# Patient Record
Sex: FEMALE | Race: WHITE | NOT HISPANIC OR LATINO | Employment: UNEMPLOYED | URBAN - METROPOLITAN AREA
[De-identification: names, ages, dates, MRNs, and addresses within clinical notes are randomized per-mention and may not be internally consistent; named-entity substitution may affect disease eponyms.]

---

## 2017-02-08 ENCOUNTER — ALLSCRIPTS OFFICE VISIT (OUTPATIENT)
Dept: OTHER | Facility: OTHER | Age: 52
End: 2017-02-08

## 2017-02-28 ENCOUNTER — ALLSCRIPTS OFFICE VISIT (OUTPATIENT)
Dept: OTHER | Facility: OTHER | Age: 52
End: 2017-02-28

## 2017-03-09 ENCOUNTER — ALLSCRIPTS OFFICE VISIT (OUTPATIENT)
Dept: OTHER | Facility: OTHER | Age: 52
End: 2017-03-09

## 2017-03-09 LAB — S PYO AG THROAT QL: POSITIVE

## 2017-04-04 ENCOUNTER — GENERIC CONVERSION - ENCOUNTER (OUTPATIENT)
Dept: OTHER | Facility: OTHER | Age: 52
End: 2017-04-04

## 2017-05-31 ENCOUNTER — HOSPITAL ENCOUNTER (OUTPATIENT)
Dept: RADIOLOGY | Age: 52
Discharge: HOME | End: 2017-05-31
Payer: COMMERCIAL

## 2017-05-31 DIAGNOSIS — Z12.31 ENCOUNTER FOR SCREENING MAMMOGRAM FOR MALIGNANT NEOPLASM OF BREAST: ICD-10-CM

## 2017-05-31 PROCEDURE — G0202 SCR MAMMO BI INCL CAD: HCPCS

## 2017-06-28 ENCOUNTER — ALLSCRIPTS OFFICE VISIT (OUTPATIENT)
Dept: OTHER | Facility: OTHER | Age: 52
End: 2017-06-28

## 2017-06-28 DIAGNOSIS — F32.9 MAJOR DEPRESSIVE DISORDER, SINGLE EPISODE: ICD-10-CM

## 2017-06-28 DIAGNOSIS — R53.83 OTHER FATIGUE: ICD-10-CM

## 2017-06-28 DIAGNOSIS — M79.10 MYALGIA: ICD-10-CM

## 2017-06-28 DIAGNOSIS — F41.9 ANXIETY DISORDER: ICD-10-CM

## 2017-06-28 DIAGNOSIS — Z13.6 ENCOUNTER FOR SCREENING FOR CARDIOVASCULAR DISORDERS: ICD-10-CM

## 2017-06-28 DIAGNOSIS — R79.89 OTHER SPECIFIED ABNORMAL FINDINGS OF BLOOD CHEMISTRY: ICD-10-CM

## 2017-06-28 DIAGNOSIS — D50.9 IRON DEFICIENCY ANEMIA: ICD-10-CM

## 2017-06-28 DIAGNOSIS — R51 HEADACHE(784.0): ICD-10-CM

## 2017-06-30 ENCOUNTER — ALLSCRIPTS OFFICE VISIT (OUTPATIENT)
Dept: OTHER | Facility: OTHER | Age: 52
End: 2017-06-30

## 2017-08-08 ENCOUNTER — ALLSCRIPTS OFFICE VISIT (OUTPATIENT)
Dept: OTHER | Facility: OTHER | Age: 52
End: 2017-08-08

## 2017-08-08 ENCOUNTER — TRANSCRIBE ORDERS (OUTPATIENT)
Dept: ADMINISTRATIVE | Facility: HOSPITAL | Age: 52
End: 2017-08-08

## 2017-08-08 DIAGNOSIS — R41.3 MEMORY LOSS: Primary | ICD-10-CM

## 2017-08-08 DIAGNOSIS — G43.709 CHRONIC MIGRAINE WITHOUT AURA WITHOUT STATUS MIGRAINOSUS, NOT INTRACTABLE: ICD-10-CM

## 2017-08-08 DIAGNOSIS — R41.3 OTHER AMNESIA: ICD-10-CM

## 2017-08-22 ENCOUNTER — HOSPITAL ENCOUNTER (OUTPATIENT)
Dept: RADIOLOGY | Facility: HOSPITAL | Age: 52
Discharge: HOME | End: 2017-08-22
Attending: PSYCHIATRY & NEUROLOGY
Payer: COMMERCIAL

## 2017-08-22 DIAGNOSIS — R41.3 OTHER AMNESIA: ICD-10-CM

## 2017-08-22 PROCEDURE — A9585 GADOBUTROL INJECTION: HCPCS | Performed by: PSYCHIATRY & NEUROLOGY

## 2017-08-22 PROCEDURE — 70553 MRI BRAIN STEM W/O & W/DYE: CPT

## 2017-08-22 RX ADMIN — GADOBUTROL 7 ML: 604.72 INJECTION INTRAVENOUS at 10:31

## 2017-11-09 ENCOUNTER — ALLSCRIPTS OFFICE VISIT (OUTPATIENT)
Dept: OTHER | Facility: OTHER | Age: 52
End: 2017-11-09

## 2017-11-10 NOTE — PROGRESS NOTES
Assessment    1  Chronic migraine without aura (346 70) (G43 709)   2  Memory difficulties (780 93) (R41 3)   3  Tension type headache (339 10) (G44 209)   4  Anxiety and depression (300 00,311) (F41 8)    Plan  Chronic migraine without aura    · Divalproex Sodium  MG Oral Tablet Extended Release 24 Hour; 1 tablet atbedtime x 5 days then increase to 2 tablets at bedtime there after   Rx By: Jovanny Jauregui; Dispense: 0 Days ; #:60 Tablet Extended Release 24 Hour; Refill: 6;Chronic migraine without aura; [place] = N; Verified Transmission to [facility]/PHARMACY #[id number] Last Updated By: [employer]; 11/9/2017 11:31:16 AM   · Prochlorperazine Maleate 10 MG Oral Tablet; 1 PO Q 6H PRN NAUSEA/HEADACHE(MAX 3 DAYS/WK)   Rx By: Jovanny Jauregui; Dispense: 0 Days ; #:15 Tablet; Refill: 3;Chronic migraine without aura; [place] = N; Sent To: [facility]/PHARMACY #[id number]   · Follow-up visit in 3 months Evaluation and Treatment  Follow-up with Viviane Phillips  Status: Complete  Done: [id number]   Ordered;Chronic migraine without aura; Ordered By: Jovanny Jauregui Performed:  Due: 14KYX8636; Last Updated By: Maria E Gaviria; 11/9/2017 11:38:08 AM    Discussion/Summary  Discussion Summary:   Preventive;Depakote 250 mg one at bedtime for 5 days then two after that  Magnesium oxide 400mg in am dailycyproheptadine 4mg as needed for weather related headaches  Compazine 10mg if needed  loss:MRI - will repeat in a year to follow up on the diffuse atrophy noted  Consider NP eval once her headaches have improved  Chief Complaint  Chief Complaint Free Text Note Form: Pt is here for a neuro f/u; memory, headaches  History of Present Illness  HPI: We had the pleasure of evaluating Mark Banuelos in neurological follow up today  As you know she is a 47year old right handed female  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  Her 5year old has autism  She has history of uterine cancer and is s/p full hysterectomy in 2011   She had no chemo or radiation  problem:She states she has stress in her life due to her daughter having autism  She has noted she has lost her car in the parking lot  She walks into a place and is not sure why she came in for  She has gotten to places and is not sure how she got there  For the last 2-3 years she is having more memory loss  Continues to be a problem as the stress increases in her lift   migraine headaches:Mag 280mg drink, CyproheptadineExcedrin, Tylenol,trigger: Fatigue, Stress/Tension, Weather change,none  states she had more stress this morning which is causing a headaches for her at this time  often do the headaches occur â for the last 17 years  She has noted no significant improvement in her headaches  but has noted Cyproheptadine does prevent a headache from coming on when she takes at night due to weather change  time of the day do the headaches start â usually in the morninglong do the headaches last â depending on the day and weather  By 10 usually it tends to get betterare they located â occipital and at time temporal region  Can be orbitalis the intensity of pain â 1 to 8/10 â it can get up to 8/10 at least twice monthyour usual headache - Throbbing, Pounding, pressuresymptoms:Decrease of appetite, some nausea, diarrheaPhotophobia, phonophobia, sensitivity to smellProblem with concentrationrunny or stuffed-up noselight-headed or dizzy, stiff or sore neck,prefer to be alone and in a dark room, unable to work  ROS personally  head 8/23/2017    BRAIN PARENCHYMA: Volume loss, much greater than would be expected for age, prominence of all CSF-containing spaces  No acute ischemic comment cranial mass, mass effect or edema  No hemosiderin deposition  Xanthogranuloma choroid plexus stable  QUANTITATIVE:    Exam Quality: Adequate for volumetric analysis      Hippocampus    Total hippocampal volume (cc): 8 4  Percentile for similar age: 28th    Lateral ventricular volume (cc): 60 92  Percentile for similar age: Greater than 95th    Inferior lateral vent volume (cc): 1 62  Percentile for similar age: 29th      Quantitative conclusions:  Hippocampi:  Slightly diminished  Lateral Ventricle Volume: Markedly prominent  Temporal Horn Volume: Relatively diminished    Concordance between qualitative and quantitative hippocampal volume assessment: Concordant  Change in brain volumes: No previous volumetric study for comparison    Mean hippocampal volume loss among normal elderly: 0 7% per year, (-0 3 to 1 7; Bishop 2008; also Wilfredo 2010)  VENTRICLES: Prominent for age, reflecting diffuse generalized volume loss    SELLA AND PITUITARY GLAND: Normal     ORBITS: Normal     PARANASAL SINUSES: Normal     VASCULATURE: Evaluation of the major intracranial vasculature demonstrates appropriate flow voids  CALVARIUM AND SKULL BASE: Normal     EXTRACRANIAL SOFT TISSUES: Normal           IMPRESSION:    1  No acute disease  Diffuse generalized volume loss  Review of Systems  Neurological ROS:  Constitutional: recent weight gain,-- fatigue-- and-- appetite changes  HEENT: sinus problems-- and-- feeling congested  Cardiovascular:  no chest pain or pressure, no palpitations present, the heart rate was not rapid or irregular, no swelling in the arms or legs, no poor circulation  Respiratory:  no unusual or persistant cough, no shortness of breath with or without exertion  Gastrointestinal: diarrhea  Genitourinary:  no incontinence, no feelings of urinary urgency, no increase in frequency, no urinary hesitancy, no dysuria, no hematuria  Musculoskeletal: arthralgias-- and-- head/neck/back pain  Integumentary  no masses, no rash, no skin lesions, no livedo reticularis  Psychiatric: mood swings  Endocrine loss of sexual ability or drive   Hematologic/Lymphatic:  no unusual bleeding, no tendency for easy bruising, no clotting skin or lumps    Neurological General: headache,-- increased sleepiness-- and-- waking up at night  Neurological Mental Status: memory problems  Neurological Cranial Nerves:  no blurry or double vision, no loss of vision, no face drooping, no facial numbness or weakness, no taste or smell loss/changes, no hearing loss or ringing, no vertigo or dizziness, no dysphagia, no slurred speech  Neurological Motor findings include:  no tremor, no twitching, no cramping(pre/post exercise), no atrophy  Neurological Coordination:  no unsteadiness, no vertigo or dizziness, no clumsiness, no problems reaching for objects  Neurological Sensory:  no numbness, no pain, no tingling, does not fall when eyes closed or taking a shower  Neurological Gait:  no difficulty walking, not falling to one side, no sensation of being pushed, has not had falls  ROS Reviewed:   ROS reviewed  Active Problems  1  Abnormal CBC (790 6) (R79 89)   2  Allergic rhinitis (477 9) (J30 9)   3  Anxiety and depression (300 00,311) (F41 8)   4  Chronic headaches (784 0) (R51)   5  Chronic migraine without aura (346 70) (G43 709)   6  Chronic pelvic pain in female (186 3,009 24) (R10 2,G89 29)   7  Colon cancer screening (V76 51) (Z12 11)   8  Early menopause (256 31) (E28 319)   9  Encounter for screening for vascular disease (V81 2) (Z13 6)   10  Fatigue (780 79) (R53 83)   11  Groin mass (789 39) (R19 09)   12  IBS (irritable bowel syndrome) (564 1) (K58 9)   13  Insomnia (780 52) (G47 00)   14  Iron deficiency anemia (280 9) (D50 9)   15  Memory difficulties (780 93) (R41 3)   16  Myalgia (729 1) (M79 1)   17  Oral herpes (054 2) (B00 2)   18  Sore throat (462) (J02 9)   19  Tension type headache (339 10) (G44 209)   20  Viral URI (465 9) (J06 9,B97 89)    Past Medical History  1  History of endometrial cancer (V10 42) (Z85 42)   2  History of sinusitis (V12 69) (Z87 09)   3  History of streptococcal pharyngitis (V12 09) (Z87 09)    Surgical History  1   History of Total Abdominal Hysterectomy With Removal Of Both Ovaries    Family History  Mother    1  Family history of hypertension (V17 49) (Z82 49)   2  Family history of malignant neoplasm of breast (V16 3) (Z80 3)  Father    3  Family history of malignant neoplasm of prostate (V16 42) (Z80 42)   4  Family history of Pacemaker Placement  Sister    5  Family history of Anxiety and depression    Social History     · Housewife or homemaker   ·    · Never a smoker   · No secondhand smoke exposure (V49 89) (Z78 9)   · Reading    Current Meds   1  ALPRAZolam 0 5 MG Oral Tablet; TAKE 1 TABLET AT BEDTIME AS NEEDED FOR SLEEP; Therapy: 05VWZ8823 to (Evaluate:10Mar2017); Last Rx:08Feb2017 Ordered   2  ClonazePAM 0 5 MG Oral Tablet; TAKE 1 TABLET AT BEDTIME AS NEEDED; Therapy: 37CYG0773 to Recorded   3  CloNIDine HCl - 0 1 MG Oral Tablet; Therapy: (75 196 772) to Recorded   4  Cyproheptadine HCl - 4 MG Oral Tablet; TAKE 2 TAB QHS; Therapy: 31Dlf6066 to (Evaluate:09Cjg6608)  Requested for: 29Aug2017; Last Rx:29Aug2017 Ordered   5  DULoxetine HCl - 30 MG Oral Capsule Delayed Release Particles; 90 mg daily; Therapy: 26JQV2853 to (Last Rx:08Feb2017) Ordered   6  HydrOXYzine HCl - 50 MG Oral Tablet; TAKE 1 TABLET AT BEDTIME; Therapy: 56NDH9697 to (Evaluate:00Olt6294); Last Rx:08Feb2017 Ordered   7  Hyoscyamine Sulfate 0 125 MG Oral Tablet Disintegrating; TAKE 1 TABLET Twice daily PRN; Therapy: 58LSY4657 to (Last Rx:08Feb2017) Ordered   8  Rexulti 0 5 MG Oral Tablet; Therapy: (75 196 772) to Recorded   9  ValACYclovir HCl - 500 MG Oral Tablet; TAKE ONE TABLET BY MOUTH ONCE DAILY; Therapy: 22HIO6099 to (Last Rx:08Feb2017) Ordered    Allergies    1  No Known Drug Allergies    Vitals  Signs   Recorded: 73KEQ1917 11:06AM   Heart Rate: 98  Systolic: 724  Diastolic: 68  Weight: 360 lb   BMI Calculated: 24 48  BSA Calculated: 1 86    Physical Exam   Constitutional  General appearance: No acute distress, well appearing and well nourished     Eyes  Ophthalmoscopic examination: Vision is grossly normal  Gross visual field testing by confrontation shows no abnormalities  EOMI in both eyes  Conjunctivae clear  Eyelids normal palpebral fissures equal  Orbits exhibit normal position  No discharge from the eyes  PERRL  Musculoskeletal  Gait and station: Normal gait, stance and balance  Muscle strength: Normal strength throughout  Muscle tone: No atrophy, abnormal movements, flaccidity, cogwheeling or spasticity     Neurologic  Orientation to person, place, and time: Normal    2nd cranial nerve: Normal    3rd, 4th, and 6th cranial nerves: Normal    5th cranial nerve: Normal    7th cranial nerve: Normal    8th cranial nerve: Normal    9th cranial nerve: Normal    11th cranial nerve: Normal    12th cranial nerve: Normal    Sensation: Normal    Reflexes: Normal    Coordination: Normal    Mood and affect: Normal        Future Appointments    Date/Time Provider Specialty Site   02[facility]14/2018 10:45 [facility]       Signatures   Electronically signed by : Isaias Dickson MD; Nov 9 2017 11:42AM EST                       (Author)

## 2018-01-12 VITALS
SYSTOLIC BLOOD PRESSURE: 130 MMHG | BODY MASS INDEX: 23.19 KG/M2 | TEMPERATURE: 96.8 F | HEART RATE: 60 BPM | DIASTOLIC BLOOD PRESSURE: 70 MMHG | HEIGHT: 68 IN | WEIGHT: 153 LBS

## 2018-01-13 VITALS
SYSTOLIC BLOOD PRESSURE: 122 MMHG | HEART RATE: 96 BPM | BODY MASS INDEX: 23.79 KG/M2 | HEIGHT: 68 IN | DIASTOLIC BLOOD PRESSURE: 70 MMHG | WEIGHT: 157 LBS | TEMPERATURE: 98 F

## 2018-01-13 VITALS
HEIGHT: 68 IN | BODY MASS INDEX: 23.27 KG/M2 | SYSTOLIC BLOOD PRESSURE: 130 MMHG | HEART RATE: 84 BPM | WEIGHT: 153.5 LBS | DIASTOLIC BLOOD PRESSURE: 80 MMHG | TEMPERATURE: 98.3 F

## 2018-01-14 VITALS
HEIGHT: 68 IN | SYSTOLIC BLOOD PRESSURE: 128 MMHG | BODY MASS INDEX: 23.53 KG/M2 | DIASTOLIC BLOOD PRESSURE: 80 MMHG | HEART RATE: 68 BPM | WEIGHT: 155.25 LBS

## 2018-01-14 VITALS
SYSTOLIC BLOOD PRESSURE: 122 MMHG | BODY MASS INDEX: 23.79 KG/M2 | HEART RATE: 64 BPM | HEIGHT: 68 IN | WEIGHT: 157 LBS | DIASTOLIC BLOOD PRESSURE: 70 MMHG

## 2018-01-15 VITALS
HEIGHT: 68 IN | SYSTOLIC BLOOD PRESSURE: 152 MMHG | RESPIRATION RATE: 16 BRPM | DIASTOLIC BLOOD PRESSURE: 71 MMHG | BODY MASS INDEX: 23.73 KG/M2 | HEART RATE: 106 BPM | WEIGHT: 156.56 LBS

## 2018-01-15 VITALS
BODY MASS INDEX: 24.48 KG/M2 | DIASTOLIC BLOOD PRESSURE: 68 MMHG | SYSTOLIC BLOOD PRESSURE: 138 MMHG | WEIGHT: 161 LBS | HEART RATE: 98 BPM

## 2018-01-17 NOTE — PROGRESS NOTES
Assessment    1  Strep pharyngitis (034 0) (J02 0)   2  Sinusitis (473 9) (J32 9)   3  Allergic rhinitis (477 9) (J30 9)    Plan  Allergic rhinitis    · Fluticasone Propionate 50 MCG/ACT Nasal Suspension; Two sprays in each nostril  once daily  Sinusitis, Strep pharyngitis    · Sulfamethoxazole-Trimethoprim 800-160 MG Oral Tablet (Bactrim DS); TAKE 1  TABLET TWICE DAILY UNTIL FINISHED  Sore throat    · Rapid StrepA- POC; Source:Throat; Status:Complete - Retrospective By Protocol  Authorization;   Done: 88IMD6827 10:14AM    Discussion/Summary    #1: Strep: Patient does appear to have strep pharyngitis based on the tip rapid test today being positive  Would recommend Bactrim DS that she was recently on Ceftin  Bactrim should also clear strep  I'm choosing Bactrim secondary to are also having some sinus symptoms  #2: Sinusitis: Patient does have some symptoms of sinus infection  Facial pain and tooth pain are still present  It is in the maxillary area  Mucinex over-the-counter or Robitussin would also be reasonable  #3: Allergic rhinitis: Patient does have allergies chronically  She is using Zyrtec every day, and I would recommend that she continue that  Also, recommend that she start Flonase 2 sprays each nostril once a day  Redemonstrated with her how to use the Flonase  She was quite aware of this having been to an allergist with her daughter previously  Chief Complaint  C/O SORE THROAT  ONSET LAST NIGHT  WANTS TO MAKE SURE IT IS NOT STREP  RAPID STREP A IS POSITIVE  History of Present Illness  HPI: Patient is been having a sore throat, headaches, fever, dysphasia, postnasal drip  Has been on antibiotics in the recent past for this as well  She was unsure what it might be, and was concerned that it is strep  Review of Systems    Constitutional: No fever, no chills, feels well, no tiredness, no recent weight gain or loss     ENT: no ear ache, no loss of hearing, no nosebleeds or nasal discharge, no sore throat or hoarseness  Cardiovascular: no complaints of slow or fast heart rate, no chest pain, no palpitations, no leg claudication or lower extremity edema  Respiratory: no complaints of shortness of breath, no wheezing, no dyspnea on exertion, no orthopnea or PND  Breasts: no complaints of breast pain, breast lump or nipple discharge  Gastrointestinal: no complaints of abdominal pain, no constipation, no nausea or diarrhea, no vomiting, no bloody stools  Genitourinary: no complaints of dysuria, no incontinence, no pelvic pain, no dysmenorrhea, no vaginal discharge or abnormal vaginal bleeding  Musculoskeletal: no complaints of arthralgia, no myalgia, no joint swelling or stiffness, no limb pain or swelling  Integumentary: no complaints of skin rash or lesion, no itching or dry skin, no skin wounds  Neurological: no complaints of headache, no confusion, no numbness or tingling, no dizziness or fainting  Active Problems    1  Allergic rhinitis (477 9) (J30 9)   2  Anxiety and depression (300 00,311) (F41 9,F32 9)   3  Chronic headaches (784 0) (R51)   4  Chronic pelvic pain in female (939 5,180 46) (R10 2,G89 29)   5  IBS (irritable bowel syndrome) (564 1) (K58 9)   6  Insomnia (780 52) (G47 00)   7  Oral herpes (054 2) (B00 2)   8  Sinusitis (473 9) (J32 9)    Past Medical History    1  History of endometrial cancer (V10 42) (Z85 42)  Active Problems And Past Medical History Reviewed: The active problems and past medical history were reviewed and updated today  Family History  Mother    1  Family history of hypertension (V17 49) (Z82 49)   2  Family history of malignant neoplasm of breast (V16 3) (Z80 3)  Father    3  Family history of malignant neoplasm of prostate (V16 42) (Z80 42)   4   Family history of Pacemaker Placement    Social History    · Housewife or homemaker   ·    · Never a smoker   · No secondhand smoke exposure (V49 89) (Z78 9)   · Reading    Surgical History    1  History of Total Abdominal Hysterectomy With Removal Of Both Ovaries  Surgical History Reviewed: The surgical history was reviewed and updated today  Current Meds   1  ALPRAZolam 0 5 MG Oral Tablet; TAKE 1 TABLET AT BEDTIME AS NEEDED FOR   SLEEP; Therapy: 53VNF3070 to (Evaluate:10Mar2017); Last Rx:08Feb2017 Ordered   2  DULoxetine HCl - 30 MG Oral Capsule Delayed Release Particles; 90 mg daily; Therapy: 96GMC2001 to (Last Rx:08Feb2017) Ordered   3  HydrOXYzine HCl - 50 MG Oral Tablet; TAKE 1 TABLET AT BEDTIME; Therapy: 38YLI0203 to (Evaluate:07Aug2017); Last Rx:08Feb2017 Ordered   4  Hyoscyamine Sulfate 0 125 MG Oral Tablet Dispersible; TAKE 1 TABLET Twice daily   PRN; Therapy: 39HCW4004 to (Last Rx:08Feb2017) Ordered   5  LamoTRIgine 25 MG Oral Tablet; 50 mg daily per PM Yennifer Álvarez; Therapy: 89OSR2533 to (Last Rx:08Feb2017) Ordered   6  ValACYclovir HCl - 500 MG Oral Tablet; TAKE ONE TABLET BY MOUTH ONCE DAILY; Therapy: 22KIA2129 to (Last Rx:08Feb2017) Ordered   7  ZyrTEC Allergy 10 MG Oral Tablet; TAKE 1 TABLET DAILY AS DIRECTED; Therapy: 29DNT6281 to (Evaluate:10Mar2017); Last Rx:08Feb2017 Ordered    The medication list was reviewed and updated today  Allergies    1  No Known Drug Allergies    Vitals   Recorded: 11OWI0023 10:07AM   Temperature 98 F, Oral   Heart Rate 96   Systolic 648   Diastolic 70   Height 5 ft 8 in   Weight 157 lb    BMI Calculated 23 87   BSA Calculated 1 85     Physical Exam    Constitutional   General appearance: No acute distress, well appearing and well nourished  Ears, Nose, Mouth, and Throat   External inspection of ears and nose: Normal     Otoscopic examination: Tympanic membranes translucent with normal light reflex  Canals patent without erythema  Nasal mucosa, septum, and turbinates: Normal without edema or erythema  Oropharynx: Normal with no erythema, edema, exudate or lesions      Pulmonary   Respiratory effort: No increased work of breathing or signs of respiratory distress  Auscultation of lungs: Clear to auscultation  Results/Data  Rapid Beajean paul Nunez- POC 28KLU8117 10:14AM Sujit Dickson     Test Name Result Flag Reference   Rapid Strep Positive         Signatures   Electronically signed by :  ML Torres ; Mar  9 2017 10:51AM EST                       (Author)

## 2018-02-16 ENCOUNTER — APPOINTMENT (OUTPATIENT)
Dept: LAB | Facility: CLINIC | Age: 53
End: 2018-02-16
Payer: COMMERCIAL

## 2018-02-16 ENCOUNTER — OFFICE VISIT (OUTPATIENT)
Dept: FAMILY MEDICINE CLINIC | Facility: CLINIC | Age: 53
End: 2018-02-16
Payer: COMMERCIAL

## 2018-02-16 ENCOUNTER — TRANSCRIBE ORDERS (OUTPATIENT)
Dept: LAB | Facility: CLINIC | Age: 53
End: 2018-02-16

## 2018-02-16 VITALS
DIASTOLIC BLOOD PRESSURE: 80 MMHG | TEMPERATURE: 98.9 F | WEIGHT: 164 LBS | BODY MASS INDEX: 24.86 KG/M2 | HEIGHT: 68 IN | SYSTOLIC BLOOD PRESSURE: 130 MMHG | HEART RATE: 72 BPM

## 2018-02-16 DIAGNOSIS — R25.1 EPISODE OF SHAKING: ICD-10-CM

## 2018-02-16 DIAGNOSIS — R10.32 LEFT LOWER QUADRANT PAIN: ICD-10-CM

## 2018-02-16 DIAGNOSIS — R19.7 DIARRHEA, UNSPECIFIED TYPE: Primary | ICD-10-CM

## 2018-02-16 PROBLEM — G89.29 CHRONIC PELVIC PAIN IN FEMALE: Status: ACTIVE | Noted: 2017-02-08

## 2018-02-16 PROBLEM — K58.9 IBS (IRRITABLE BOWEL SYNDROME): Status: ACTIVE | Noted: 2017-02-08

## 2018-02-16 PROBLEM — F41.9 ANXIETY AND DEPRESSION: Status: ACTIVE | Noted: 2017-02-08

## 2018-02-16 PROBLEM — R10.2 CHRONIC PELVIC PAIN IN FEMALE: Status: ACTIVE | Noted: 2017-02-08

## 2018-02-16 PROBLEM — G47.00 INSOMNIA: Status: ACTIVE | Noted: 2017-02-08

## 2018-02-16 PROBLEM — R19.09 GROIN MASS: Status: ACTIVE | Noted: 2017-07-05

## 2018-02-16 PROBLEM — F32.A ANXIETY AND DEPRESSION: Status: ACTIVE | Noted: 2017-02-08

## 2018-02-16 PROBLEM — D50.9 IRON DEFICIENCY ANEMIA: Status: ACTIVE | Noted: 2017-06-28

## 2018-02-16 PROBLEM — J30.9 ALLERGIC RHINITIS: Status: ACTIVE | Noted: 2017-02-08

## 2018-02-16 PROBLEM — G43.709 CHRONIC MIGRAINE WITHOUT AURA: Status: ACTIVE | Noted: 2017-08-08

## 2018-02-16 LAB
ALBUMIN SERPL BCP-MCNC: 4.3 G/DL (ref 3.5–5)
ALP SERPL-CCNC: 111 U/L (ref 46–116)
ALT SERPL W P-5'-P-CCNC: 20 U/L (ref 12–78)
ANION GAP SERPL CALCULATED.3IONS-SCNC: 7 MMOL/L (ref 4–13)
AST SERPL W P-5'-P-CCNC: 18 U/L (ref 5–45)
BASOPHILS # BLD AUTO: 0.02 THOUSANDS/ΜL (ref 0–0.1)
BASOPHILS NFR BLD AUTO: 0 % (ref 0–1)
BILIRUB SERPL-MCNC: 0.45 MG/DL (ref 0.2–1)
BUN SERPL-MCNC: 19 MG/DL (ref 5–25)
CALCIUM SERPL-MCNC: 10 MG/DL (ref 8.3–10.1)
CHLORIDE SERPL-SCNC: 103 MMOL/L (ref 100–108)
CO2 SERPL-SCNC: 30 MMOL/L (ref 21–32)
CREAT SERPL-MCNC: 0.9 MG/DL (ref 0.6–1.3)
EOSINOPHIL # BLD AUTO: 0.04 THOUSAND/ΜL (ref 0–0.61)
EOSINOPHIL NFR BLD AUTO: 1 % (ref 0–6)
ERYTHROCYTE [DISTWIDTH] IN BLOOD BY AUTOMATED COUNT: 12.7 % (ref 11.6–15.1)
GFR SERPL CREATININE-BSD FRML MDRD: 73 ML/MIN/1.73SQ M
GLUCOSE SERPL-MCNC: 90 MG/DL (ref 65–140)
HCT VFR BLD AUTO: 48.3 % (ref 34.8–46.1)
HGB BLD-MCNC: 16.2 G/DL (ref 11.5–15.4)
LYMPHOCYTES # BLD AUTO: 1.97 THOUSANDS/ΜL (ref 0.6–4.47)
LYMPHOCYTES NFR BLD AUTO: 29 % (ref 14–44)
MCH RBC QN AUTO: 31.7 PG (ref 26.8–34.3)
MCHC RBC AUTO-ENTMCNC: 33.5 G/DL (ref 31.4–37.4)
MCV RBC AUTO: 95 FL (ref 82–98)
MONOCYTES # BLD AUTO: 0.55 THOUSAND/ΜL (ref 0.17–1.22)
MONOCYTES NFR BLD AUTO: 8 % (ref 4–12)
NEUTROPHILS # BLD AUTO: 4.11 THOUSANDS/ΜL (ref 1.85–7.62)
NEUTS SEG NFR BLD AUTO: 62 % (ref 43–75)
NRBC BLD AUTO-RTO: 0 /100 WBCS
PLATELET # BLD AUTO: 160 THOUSANDS/UL (ref 149–390)
PMV BLD AUTO: 10 FL (ref 8.9–12.7)
POTASSIUM SERPL-SCNC: 5 MMOL/L (ref 3.5–5.3)
PROT SERPL-MCNC: 8.1 G/DL (ref 6.4–8.2)
RBC # BLD AUTO: 5.11 MILLION/UL (ref 3.81–5.12)
SODIUM SERPL-SCNC: 140 MMOL/L (ref 136–145)
WBC # BLD AUTO: 6.71 THOUSAND/UL (ref 4.31–10.16)

## 2018-02-16 PROCEDURE — 80053 COMPREHEN METABOLIC PANEL: CPT | Performed by: FAMILY MEDICINE

## 2018-02-16 PROCEDURE — 85025 COMPLETE CBC W/AUTO DIFF WBC: CPT | Performed by: FAMILY MEDICINE

## 2018-02-16 PROCEDURE — 99214 OFFICE O/P EST MOD 30 MIN: CPT | Performed by: FAMILY MEDICINE

## 2018-02-16 PROCEDURE — 36415 COLL VENOUS BLD VENIPUNCTURE: CPT | Performed by: FAMILY MEDICINE

## 2018-02-16 RX ORDER — TRAVOPROST OPHTHALMIC SOLUTION 0.04 MG/ML
SOLUTION OPHTHALMIC
COMMUNITY
Start: 2017-10-01 | End: 2018-05-24

## 2018-02-16 RX ORDER — METRONIDAZOLE 500 MG/1
500 TABLET ORAL EVERY 8 HOURS SCHEDULED
Qty: 30 TABLET | Refills: 0 | Status: SHIPPED | OUTPATIENT
Start: 2018-02-16 | End: 2018-02-26

## 2018-02-16 RX ORDER — CIPROFLOXACIN 500 MG/1
500 TABLET, FILM COATED ORAL EVERY 12 HOURS SCHEDULED
Qty: 20 TABLET | Refills: 0 | Status: SHIPPED | OUTPATIENT
Start: 2018-02-16 | End: 2018-02-26

## 2018-02-16 NOTE — ASSESSMENT & PLAN NOTE
Most likely due to her anxiety and her recent stress, advised to continue taking her medication to take Xanax as needed

## 2018-02-16 NOTE — PROGRESS NOTES
Assessment/Plan:    Diarrhea  Started 2 weeks ago, could be her irritable bowel syndrome that is flaring up due to her recent stress, advised on probiotics, CT abdomen pelvis to be done to rule out diverticulitis specially with left lower quadrant pain  Left lower quadrant pain  With tenderness on exam need to rule out diverticulitis CT scan to be done  Prescription for ciprofloxacin/Flagyl was given to patient to take if CT scan is positive  Episode of shaking  Most likely due to her anxiety and her recent stress, advised to continue taking her medication to take Xanax as needed  IBS (irritable bowel syndrome)  Getting out of control lately, has regular follow-up with GI  Anxiety and depression  Patient has regular follow-up with psychiatrist, to continue the same medication  Diagnoses and all orders for this visit:    Diarrhea, unspecified type  -     CT abdomen pelvis w contrast  -     ciprofloxacin (CIPRO) 500 mg tablet; Take 1 tablet (500 mg total) by mouth every 12 (twelve) hours for 10 days  -     metroNIDAZOLE (FLAGYL) 500 mg tablet; Take 1 tablet (500 mg total) by mouth every 8 (eight) hours for 10 days    Episode of shaking  -     CBC and differential    Left lower quadrant pain  -     CT abdomen pelvis w contrast  -     Comprehensive metabolic panel    Other orders  -     travoprost (TRAVATAN Z) 0 004 % ophthalmic solution;           Subjective:      Patient ID: Manny Reagan is a 47 y o  female  Chief complaint:  Pt states she has had diarrhea for approx  2 weeks    Cd  diarrhea for the last 2 weeks   Feel bloated, a lot of gas, some nausea, left lower quadrant pain, nonradiating,      Diarrhea    This is a new problem  Episode onset: Two weeks  The problem occurs 2 to 4 times per day  The problem has been unchanged  The stool consistency is described as watery  The patient states that diarrhea does not awaken her from sleep   Pertinent negatives include no abdominal pain, arthralgias, bloating, chills, coughing, fever, headaches, increased  flatus, myalgias, sweats, URI, vomiting or weight loss  There are no known risk factors  Her past medical history is significant for irritable bowel syndrome  There is no history of bowel resection, inflammatory bowel disease, malabsorption, a recent abdominal surgery or short gut syndrome  The following portions of the patient's history were reviewed and updated as appropriate: allergies, current medications, past family history, past medical history, past social history, past surgical history and problem list     Review of Systems   Constitutional: Negative for activity change, appetite change, chills, fatigue, fever and weight loss  Respiratory: Negative for cough  Gastrointestinal: Positive for diarrhea  Negative for abdominal distention, abdominal pain, anal bleeding, bloating, blood in stool, constipation, flatus, nausea, rectal pain and vomiting  Genitourinary: Negative for difficulty urinating, dysuria, enuresis, flank pain, frequency, hematuria, pelvic pain, urgency, vaginal bleeding, vaginal discharge and vaginal pain  Musculoskeletal: Negative for arthralgias, back pain, joint swelling and myalgias  Neurological: Negative for headaches  Objective:  Vitals:    02/16/18 1035   BP: 130/80   Pulse: 72   Temp: 98 9 °F (37 2 °C)   TempSrc: Oral   Weight: 74 4 kg (164 lb)   Height: 5' 8" (1 727 m)                Physical Exam   Constitutional: She is oriented to person, place, and time  She appears well-developed and well-nourished  HENT:   Head: Normocephalic and atraumatic  Eyes: Conjunctivae and EOM are normal  Pupils are equal, round, and reactive to light  Neck: Normal range of motion  Neck supple  Cardiovascular: Normal rate, regular rhythm, normal heart sounds and intact distal pulses  Pulmonary/Chest: Effort normal and breath sounds normal    Abdominal: Soft   Bowel sounds are normal  There is no hepatosplenomegaly  There is no CVA tenderness  No hernia  Musculoskeletal: Normal range of motion  Neurological: She is alert and oriented to person, place, and time  She has normal reflexes  Skin: Skin is warm and dry  Psychiatric: She has a normal mood and affect  Her behavior is normal    Nursing note and vitals reviewed

## 2018-02-16 NOTE — ASSESSMENT & PLAN NOTE
Started 2 weeks ago, could be her irritable bowel syndrome that is flaring up due to her recent stress, advised on probiotics, CT abdomen pelvis to be done to rule out diverticulitis specially with left lower quadrant pain

## 2018-02-16 NOTE — ASSESSMENT & PLAN NOTE
With tenderness on exam need to rule out diverticulitis CT scan to be done  Prescription for ciprofloxacin/Flagyl was given to patient to take if CT scan is positive

## 2018-02-16 NOTE — PATIENT INSTRUCTIONS
Acute Diarrhea   AMBULATORY CARE:   Acute diarrhea  starts quickly and lasts a short time, usually 1 to 3 days  It can last up to 2 weeks  Signs and symptoms that may happen with diarrhea:  You may have 3 or more episodes of diarrhea  It may be hard to control your diarrhea  You may also have any of the following:  · Fever and chills    · Headache or abdominal pain    · Nausea and vomiting    · Symptoms of dehydration such as thirst, decreased urination, dry skin, sunken eyes, or fast, pounding heartbeat  Seek care immediately if:   · You feel confused  · Your heartbeat is faster than normal      · Your eyes look deeply sunken, or you have no tears when you cry  · You urinate less than usual, or your urine is dark yellow  · You have blood or mucus in your stools  · You have severe abdominal pain  · You are unable to drink any liquids  Contact your healthcare provider if:  · Your symptoms do not get better with treatment  · You have a fever higher than 101 3°F (38 5°C)  · You have trouble eating and drinking because you are vomiting  · You are thirsty or have a dry mouth  · Your diarrhea does not get better in 7 days  · You have questions or concerns about your condition or care  Treatment for acute diarrhea  may include medicines to slow or stop your diarrhea  You may also need medicine to treat an infection  Self-care:   · Drink liquids as directed  Liquids will help prevent dehydration caused by diarrhea  Ask your healthcare provider how much liquid to drink each day and which liquids are best for you  You may need to drink an oral rehydration solution (ORS)  An ORS has the right amounts of water, salts, and sugar you need to replace body fluids  You can buy an ORS at most grocery stores and pharmacies  · Eat foods that are easy to digest   Examples include rice, lentils, cereal, bananas, potatoes, and bread   It also includes some fruits (bananas, melon), well-cooked vegetables, and lean meats  Avoid foods high in fiber, fat, and sugar  Also avoid caffeine, alcohol, dairy, and red meat until your diarrhea is gone  Prevent diarrhea:   · Wash your hands often  Use soap and water  Wash your hands before you eat or prepare food  Also wash your hands after you use the bathroom  Use an alcohol-based hand gel when soap and water are not available  · Keep bathroom surfaces clean  This helps prevent the spread of germs that cause acute diarrhea  · Wash fruits and vegetables well before you eat them  This can help remove germs that cause diarrhea  If possible, remove the skin from fruits and vegetables, or cook them well before you eat them  · Cook meat as directed  ¨ Cook ground meat  to 160°F      ¨ Cook ground poultry, whole poultry, or cuts of poultry  to at least 165°F  Remove the meat from heat  Let it stand for 3 minutes before you eat it  ¨ Cook whole cuts of meat other than poultry  to at least 145°F  Remove the meat from heat  Let it stand for 3 minutes before you eat it  · Wash dishes that have touched raw meat with hot water and soap  This includes cutting boards, utensils, dishes, and serving containers  · Place raw or cooked meat in the refrigerator as soon as possible  Bacteria can grow in meat that is left at room temperature too long  · Do not eat raw or undercooked oysters, clams, or mussels  These foods may be contaminated and cause infection  · Drink filtered or treated water only when you travel  Do not put ice in your drinks  Drink bottled water whenever possible  Follow up with your healthcare provider as directed:  Write down your questions so you remember to ask them during your visits  © 2017 [address] [employer] Information is for End User's use only and may not be sold, redistributed or otherwise used for commercial purposes   All illustrations and images included in CareNotes® are the copyrighted property of [employer]  or [address]  The above information is an  only  It is not intended as medical advice for individual conditions or treatments  Talk to your doctor, nurse or pharmacist before following any medical regimen to see if it is safe and effective for you

## 2018-02-23 ENCOUNTER — HOSPITAL ENCOUNTER (OUTPATIENT)
Dept: RADIOLOGY | Facility: HOSPITAL | Age: 53
Discharge: HOME | End: 2018-02-23
Payer: COMMERCIAL

## 2018-02-23 PROCEDURE — 74177 CT ABD & PELVIS W/CONTRAST: CPT

## 2018-02-23 RX ADMIN — IOHEXOL 100 ML: 350 INJECTION, SOLUTION INTRAVENOUS at 13:45

## 2018-03-20 PROBLEM — R79.89 ABNORMAL CBC: Status: ACTIVE | Noted: 2017-07-05

## 2018-03-20 PROBLEM — Z01.818 PRE-OPERATIVE GENERAL PHYSICAL EXAMINATION: Status: ACTIVE | Noted: 2018-03-20

## 2018-03-20 PROBLEM — E28.319 EARLY MENOPAUSE: Status: ACTIVE | Noted: 2017-08-08

## 2018-03-20 PROBLEM — B00.2 ORAL HERPES: Status: ACTIVE | Noted: 2017-02-08

## 2018-03-21 ENCOUNTER — OFFICE VISIT (OUTPATIENT)
Dept: FAMILY MEDICINE CLINIC | Facility: CLINIC | Age: 53
End: 2018-03-21
Payer: COMMERCIAL

## 2018-03-21 VITALS
TEMPERATURE: 97.8 F | SYSTOLIC BLOOD PRESSURE: 118 MMHG | WEIGHT: 166.6 LBS | HEART RATE: 72 BPM | DIASTOLIC BLOOD PRESSURE: 62 MMHG | BODY MASS INDEX: 25.33 KG/M2

## 2018-03-21 DIAGNOSIS — H25.9 AGE-RELATED CATARACT OF BOTH EYES, UNSPECIFIED AGE-RELATED CATARACT TYPE: ICD-10-CM

## 2018-03-21 DIAGNOSIS — Z01.818 PRE-OPERATIVE GENERAL PHYSICAL EXAMINATION: Primary | ICD-10-CM

## 2018-03-21 PROBLEM — H25.89 OTHER AGE-RELATED CATARACT: Status: RESOLVED | Noted: 2018-03-21 | Resolved: 2018-03-21

## 2018-03-21 PROBLEM — H25.89 OTHER AGE-RELATED CATARACT: Status: ACTIVE | Noted: 2018-03-21

## 2018-03-21 PROCEDURE — 93000 ELECTROCARDIOGRAM COMPLETE: CPT | Performed by: PHYSICIAN ASSISTANT

## 2018-03-21 PROCEDURE — 99243 OFF/OP CNSLTJ NEW/EST LOW 30: CPT | Performed by: PHYSICIAN ASSISTANT

## 2018-03-21 RX ORDER — CYPROHEPTADINE HYDROCHLORIDE 4 MG/1
8 TABLET ORAL
Refills: 3 | COMMUNITY
Start: 2018-03-01 | End: 2018-05-16 | Stop reason: SDUPTHER

## 2018-03-21 RX ORDER — HYDROXYZINE 50 MG/1
50 TABLET, FILM COATED ORAL
Refills: 0 | COMMUNITY
Start: 2018-03-06

## 2018-03-21 RX ORDER — CLONIDINE HYDROCHLORIDE 0.1 MG/1
0.2 TABLET ORAL
Refills: 0 | COMMUNITY
Start: 2018-03-06

## 2018-03-21 NOTE — ASSESSMENT & PLAN NOTE
Blood work from February 16 CBC CMP within normal limits/acceptable however surgeon request H&H within 30 days so will order CBC without diff and if within normal limits will complete form fax, scan and have patient  the original   EKG done today 1st baseline normal limits without acute changes

## 2018-03-21 NOTE — PROGRESS NOTES
Assessment/Plan:    Pre-operative general physical examination  Blood work from February 16 CBC CMP within normal limits/acceptable however surgeon request H&H within 30 days so will order CBC without diff and if within normal limits will complete form fax, scan and have patient  the original   EKG done today 1st baseline normal limits without acute changes  Diagnoses and all orders for this visit:    Pre-operative general physical examination  -     POCT ECG  -     CBC and Platelet; Future    Age-related cataract of both eyes, unspecified age-related cataract type    Other orders  -     hydrOXYzine HCL (ATARAX) 50 mg tablet; TAKE 2 TABS BYMOUTH AT BEDTIME  -     cloNIDine (CATAPRES) 0 1 mg tablet; TAKE 1/2 TABLET BY MOUTH IN THE AM AND 1 &1/2 TABS AT BEDTIME  -     cyproheptadine (PERIACTIN) 4 mg tablet; 8 mg daily at bedtime          Subjective:   CC: Pt  Is here for pre op clearance  Pt  Is scheduled  for cataract surgery on 4/3/18 and 4/17/18 with Dr Brenda Rosario  [facility]     Patient ID: Lisa Severino is a 46 y o  female  Subjective:     Patient ID: Lisa Severino is a 46 y o  female  No chief complaint on file        [unfilled]    The following portions of the patient's history were reviewed and updated as appropriate: allergies, current medications, past family history, past medical history, past social history, past surgical history and problem list     Procedure date: 4/3/18 and 4/17/18    Surgeon:  Dr Gonzalez    Planned procedure:  Cataract surgery     Diagnosis for procedure:  Sim  cataracts    Prior anesthesia: yes   Type: general     Problem with anesthesia: no    CAD History: no    KUSH history:no      [unfilled]     Current Outpatient Prescriptions:  ALPRAZolam (XANAX) 0 5 mg tablet, Take 1 tablet by mouth, Disp: , Rfl:   cloNIDine (CATAPRES) 0 1 mg tablet, TAKE 1/2 TABLET BY MOUTH IN THE AM AND 1 &1/2 TABS AT BEDTIME, Disp: , Rfl: 0  cyproheptadine (PERIACTIN) 4 mg tablet, 8 mg daily at bedtime, Disp: , Rfl: 3  divalproex sodium (DEPAKOTE ER) 250 mg 24 hr tablet, Take by mouth, Disp: , Rfl:   DULoxetine (CYMBALTA) 60 mg delayed release capsule, Take 60 mg by mouth 2 (two) times a day  , Disp: , Rfl: 1  hydrOXYzine HCL (ATARAX) 50 mg tablet, TAKE 2 TABS BYMOUTH AT BEDTIME, Disp: , Rfl: 0  REXULTI 1 MG TABS, Take 1 tablet by mouth daily at bedtime, Disp: , Rfl: 0  travoprost (TRAVATAN Z) 0 004 % ophthalmic solution, , Disp: , Rfl:   valACYclovir (VALTREX) 500 mg tablet, Take 1 tablet by mouth daily, Disp: , Rfl:     No current facility-administered medications for this visit  Patient has no known allergies  No past medical history on file  No past surgical history on file  No family history on file  Smoking status: Never Smoker                                                              Smokeless tobacco: Never Used                      Alcohol use: No                Objective:    ---------------------------------                  03/21/18                            1035            ---------------------------------   BP:             118/62            BP Location:       Left arm           Patient Position:        Sitting           Pulse:            72              Temp:      97 8 °F (36 6 °C)      TempSrc:       Tympanic           Weight: 75 6 kg (166 lb 9 6 oz)  ---------------------------------    [unfilled]     Preop labs/testing available and reviewed: yes        Assessment/Plan:    Patient is cleared for planned procedure  Further testing/evaluation is not required      Postop concerns: no    Problem List Items Addressed This Visit     Cataract, bilateral    Pre-operative general physical examination - Primary        Diagnoses and associated orders for this visit:     Pre-operative general physical examination  POCT ECG     Age-related cataract of both eyes, unspecified age-related cataract type     Other orders  hydrOXYzine HCL (ATARAX) 50 mg tablet; TAKE 2 TABS BYMOUTH AT BEDTIME  cloNIDine (CATAPRES) 0 1 mg tablet; TAKE 1/2 TABLET BY MOUTH IN THE AM AND 1 &1/2 TABS AT BEDTIME  cyproheptadine (PERIACTIN) 4 mg tablet; 8 mg daily at bedtime           The following portions of the patient's history were reviewed and updated as appropriate: allergies, current medications, past family history, past medical history, past social history, past surgical history and problem list     Review of Systems   Constitutional: Negative  HENT: Negative  Eyes: Positive for visual disturbance  Respiratory: Negative  Cardiovascular: Negative  Gastrointestinal: Negative  Endocrine: Negative  Genitourinary: Negative  Musculoskeletal: Negative  Skin: Negative  Allergic/Immunologic: Negative  Neurological: Negative  Hematological: Negative  Psychiatric/Behavioral: Negative  Objective:      Vitals:    03/21/18 1035   BP: 118/62   BP Location: Left arm   Patient Position: Sitting   Pulse: 72   Temp: 97 8 °F (36 6 °C)   TempSrc: Tympanic   Weight: 75 6 kg (166 lb 9 6 oz)            Physical Exam   Constitutional: She is oriented to person, place, and time  She appears well-developed and well-nourished  No distress  HENT:   Head: Normocephalic and atraumatic  Right Ear: Hearing, tympanic membrane, external ear and ear canal normal    Left Ear: Hearing, tympanic membrane, external ear and ear canal normal    Nose: Nose normal    Mouth/Throat: Uvula is midline, oropharynx is clear and moist and mucous membranes are normal  No oropharyngeal exudate  Eyes: Conjunctivae, EOM and lids are normal  Pupils are equal, round, and reactive to light  No scleral icterus  Neck: Normal range of motion  Carotid bruit is not present  No thyroid mass and no thyromegaly present  Cardiovascular: Regular rhythm, normal heart sounds and normal pulses  Exam reveals no gallop and no friction rub  No murmur heard    Pulmonary/Chest: Effort normal and breath sounds normal  No respiratory distress  She has no wheezes  She has no rhonchi  She has no rales  Abdominal: Soft  Normal appearance and bowel sounds are normal  She exhibits no distension, no abdominal bruit and no mass  There is no hepatosplenomegaly  There is no tenderness  There is no rebound and no guarding  No hernia  Musculoskeletal: Normal range of motion  Lymphadenopathy:     She has no cervical adenopathy  Neurological: She is alert and oriented to person, place, and time  She has normal strength and normal reflexes  She is not disoriented  No sensory deficit  She exhibits normal muscle tone  Coordination and gait normal    Skin: Skin is warm, dry and intact  She is not diaphoretic  Psychiatric: She has a normal mood and affect  Her speech is normal and behavior is normal  Judgment and thought content normal  Cognition and memory are normal    Nursing note and vitals reviewed

## 2018-03-21 NOTE — PATIENT INSTRUCTIONS
Problem List Items Addressed This Visit     Cataract, bilateral    Pre-operative general physical examination - Primary     Blood work from February 16 CBC CMP within normal limits/acceptable however surgeon request H&H within 30 days so will order CBC without diff and if within normal limits will complete form fax, scan and have patient  the original   EKG done today 1st baseline normal limits without acute changes             Relevant Orders    POCT ECG (Completed)    CBC and Platelet

## 2018-03-29 ENCOUNTER — TELEPHONE (OUTPATIENT)
Dept: FAMILY MEDICINE CLINIC | Facility: CLINIC | Age: 53
End: 2018-03-29

## 2018-03-29 DIAGNOSIS — R19.7 DIARRHEA, UNSPECIFIED TYPE: Primary | ICD-10-CM

## 2018-03-29 NOTE — TELEPHONE ENCOUNTER
No  There is no such thing as an order for a colonoscopy  I would need to place an order for the gastro group that she goes to to have it done

## 2018-03-29 NOTE — TELEPHONE ENCOUNTER
PT STOPPED IN OFFICE TO  HER PRE-OP PPW  SHE STATES THAT SHE NEEDS TO GET A COLONOSCOPY DONE AND THAT SHE CALLED THE PLACE SHE WENT TO BEFORE AND THEY NEED HER TO HAVE A SCRIPT FOR THIS AND ALSO SHE MAY NEED A REFERRAL  BUT SHE NEEDS THE SCRIPT FIRST, THEN SHE WILL MAKE THE APPT, THEN CALL REFERRAL LINE  SO SHE NEEDS A SCRIPT FOR A COLONOSCOPY PLEASE  PLEASE MAIL IT TO HER  THANK YOU

## 2018-04-07 DIAGNOSIS — G43.709 CHRONIC MIGRAINE WITHOUT AURA WITHOUT STATUS MIGRAINOSUS, NOT INTRACTABLE: Primary | ICD-10-CM

## 2018-04-08 RX ORDER — DIVALPROEX SODIUM 250 MG/1
TABLET, EXTENDED RELEASE ORAL
Qty: 60 TABLET | Refills: 2 | Status: SHIPPED | OUTPATIENT
Start: 2018-04-08 | End: 2018-04-09 | Stop reason: SDUPTHER

## 2018-04-09 DIAGNOSIS — G43.709 CHRONIC MIGRAINE WITHOUT AURA WITHOUT STATUS MIGRAINOSUS, NOT INTRACTABLE: ICD-10-CM

## 2018-04-09 RX ORDER — DIVALPROEX SODIUM 250 MG/1
500 TABLET, EXTENDED RELEASE ORAL
Qty: 60 TABLET | Refills: 2 | Status: SHIPPED | OUTPATIENT
Start: 2018-04-09 | End: 2018-04-09 | Stop reason: SDUPTHER

## 2018-04-09 RX ORDER — DIVALPROEX SODIUM 250 MG/1
500 TABLET, EXTENDED RELEASE ORAL
Qty: 180 TABLET | Refills: 0 | Status: SHIPPED | OUTPATIENT
Start: 2018-04-09 | End: 2018-05-24 | Stop reason: SINTOL

## 2018-05-16 DIAGNOSIS — G43.709 CHRONIC MIGRAINE WITHOUT AURA WITHOUT STATUS MIGRAINOSUS, NOT INTRACTABLE: Primary | ICD-10-CM

## 2018-05-16 RX ORDER — CYPROHEPTADINE HYDROCHLORIDE 4 MG/1
8 TABLET ORAL
Qty: 180 TABLET | Refills: 0 | Status: SHIPPED | OUTPATIENT
Start: 2018-05-16 | End: 2018-09-17

## 2018-05-17 RX ORDER — CLONAZEPAM 0.5 MG/1
0.5 TABLET ORAL DAILY
COMMUNITY
Start: 2018-03-22

## 2018-05-17 RX ORDER — DULOXETIN HYDROCHLORIDE 30 MG/1
CAPSULE, DELAYED RELEASE ORAL
Refills: 2 | COMMUNITY
Start: 2018-04-09 | End: 2018-05-21 | Stop reason: SDUPTHER

## 2018-05-17 RX ORDER — BROMFENAC SODIUM 0.7 MG/ML
SOLUTION/ DROPS OPHTHALMIC
COMMUNITY
Start: 2018-04-06 | End: 2018-09-17

## 2018-05-17 RX ORDER — PROCHLORPERAZINE MALEATE 10 MG
TABLET ORAL
COMMUNITY
Start: 2018-03-22 | End: 2019-04-05 | Stop reason: SDUPTHER

## 2018-05-17 NOTE — PROGRESS NOTES
Patient ID: Antonella Appiah is a 46 y o  female  Assessment/Plan:    Chronic migraine without aura  Preventative:  Continue Cymbalta 120 milligrams per psychiatry  Continue hydroxyzine at bedtime  Continue clonidine at bedtime  Continue clonazepam at bedtime  Use cyproheptadine 4-8 milligrams as needed for weather related headaches    Abortive: At onset of migraine you may try prochlorperazine  Limit Tylenol and other over-the-counter headache medications to less than 3 times a week    Please call us if her headaches worsen      Diffuse brain atrophy  Will repeat MRI prior to next visit         Problem List Items Addressed This Visit        Cardiovascular and Mediastinum    Chronic migraine without aura - Primary     Preventative:  Continue Cymbalta 120 milligrams per psychiatry  Continue hydroxyzine at bedtime  Continue clonidine at bedtime  Continue clonazepam at bedtime  Use cyproheptadine 4-8 milligrams as needed for weather related headaches    Abortive: At onset of migraine you may try prochlorperazine  Limit Tylenol and other over-the-counter headache medications to less than 3 times a week    Please call us if her headaches worsen           Relevant Medications    clonazePAM (KlonoPIN) 0 5 mg tablet       Nervous and Auditory    Diffuse brain atrophy     Will repeat MRI prior to next visit         Relevant Orders    MRI brain with and without contrast    BUN    Creatinine, serum       Other    Anxiety and depression    Relevant Medications    prochlorperazine (COMPAZINE) 10 mg tablet    Insomnia    Tremor             Subjective:    HPI  We had the pleasure of evaluating Derral Bump in neurological follow up today  As you know she is a 47year old right handed female  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  Her 8year old has autism  She has history of uterine cancer and is s/p full hysterectomy in 2011  She had no chemo or radiation  Complains of new tremor in her right hand    She states that it is more at rest than intentional tremor  Has been going on for a few months  Is not constant but can happen 3 days out of the week  Memory problem:   - She states she has stress in her life due to her daughter having autism  She has noted she has lost her car in the parking lot  She walks into a place and is not sure why she came in for  She has gotten to places and is not sure how she got there  For the last 2-3 years she is having more memory loss  - Continues to be a problem as the stress increases in her lift  No change  Feels about the same as before  MRI NQ 8/17 BRAIN PARENCHYMA: Volume loss, much greater than would be expected for age, prominence of all CSF-containing spaces  No acute ischemic comment cranial mass, mass effect or edema  No hemosiderin deposition  Xanthogranuloma choroid plexus stable  QUANTITATIVE:   Exam Quality: Adequate for volumetric analysis  Hippocampus   Total hippocampal volume (cc): 8 4   Percentile for similar age: 28th   Lateral ventricular volume (cc): 60 92   Percentile for similar age: Greater than 95th   Inferior lateral vent volume (cc): 1 62   Percentile for similar age: 29th   Quantitative conclusions:   Hippocampi: Slightly diminished   Lateral Ventricle Volume: Markedly prominent   Temporal Horn Volume: Relatively diminished   Concordance between qualitative and quantitative hippocampal volume assessment: Concordant  Change in brain volumes: No previous volumetric study for comparison   Mean hippocampal volume loss among normal elderly: 0 7% per year, (-0 3 to 1 7; Bishop 2008; also Wilfredo 2010)  VENTRICLES: Prominent for age, reflecting diffuse generalized volume loss    Chronic migraine headaches:   PREVENTIVE: Mag 280mg drink, Cyproheptadine , Depakote (tremor), cymbalta  ABORTIVE: Excedrin, Tylenol,   Headache trigger: Fatigue, Stress/Tension, Weather change,   Aura: none  How often do the headaches occur - for the last 17 years   She has noted no significant improvement in her headaches  but has noted Cyproheptadine does prevent a headache from coming on when she takes at night due to weather change  Has 20 migraine days a month  What time of the day do the headaches start - usually in the morning  How long do the headaches last - depending on the day and weather  By 10 usually it tends to get better with Tylenol  Where are they located - occipital and usually temporal region  Can be orbital  What is the intensity of pain - 3 to 7/10 - it can get up to 7/10 at least once a week  Describe your usual headache - Throbbing, Pounding, pressure  Associated symptoms:    Decrease of appetite, some nausea, diarrhea    Photophobia, phonophobia, sensitivity to smell    Problem with concentration   runny or stuffed-up nose   light-headed or dizzy, stiff or sore neck,    prefer to be alone and in a dark room, unable to work      The following portions of the patient's history were reviewed and updated as appropriate: allergies, current medications, past family history, past medical history, past social history, past surgical history and problem list          Objective:    Blood pressure 122/70, pulse 80, height 5' 8" (1 727 m), weight 75 8 kg (167 lb 3 2 oz)  Physical Exam   Constitutional: She appears well-developed and well-nourished  HENT:   Head: Normocephalic and atraumatic  Eyes: EOM are normal  Pupils are equal, round, and reactive to light  Neck: Normal range of motion  Cardiovascular: Normal rate  Pulmonary/Chest: Effort normal    Musculoskeletal: Normal range of motion  Neurological: She has normal strength and normal reflexes  Gait and coordination normal    Skin: Skin is warm and dry  Psychiatric: She has a normal mood and affect  Her speech is normal    Nursing note and vitals reviewed  Neurological Exam    Mental Status  The patient is alert and oriented to person, place, time, and situation   Her recent and remote memory are normal  She has no visuospatial neglect  Her speech is normal  Her language is fluent with no aphasia  She has normal attention span and concentration  She has a normal fund of knowledge  Cranial Nerves    CN II: The patient's visual acuity and visual fields are normal   CN III, IV, VI: The patient's pupils are equally round and reactive to light and ocular movements are normal   CN V: The patient has normal facial sensation  CN VII:  The patient has symmetric facial movement  CN VIII:  The patient's hearing is normal   CN IX, X: The patient has symmetric palate movement and normal gag reflex  CN XI: The patient's shoulder shrug strength is normal   CN XII: The patient's tongue is midline without atrophy or fasciculations  Motor  The patient has normal muscle bulk throughout  Her overall muscle tone is normal throughout  Her strength is 5/5 throughout all four extremities  No tremors noted     Sensory  The patient's sensation is normal in all four extremities  She has normal cortical sensation    Reflexes  Deep tendon reflexes are 2+ and symmetric in all four extremities with downgoing toes bilaterally  Gait and Coordination  The patient has normal gait and station and normal casual, toe, heel, and tandem gait  She has normal coordination bilaterally  ROS:    Review of Systems   Constitutional: Positive for fatigue  HENT: Positive for congestion, postnasal drip, sinus pain and sneezing  Eyes: Negative  Respiratory: Positive for shortness of breath  Cardiovascular: Negative  Gastrointestinal: Positive for diarrhea  Endocrine: Negative  Genitourinary: Negative  Musculoskeletal: Negative  Skin: Negative  Allergic/Immunologic: Positive for environmental allergies (pollen )  Neurological: Positive for light-headedness (this am )  Hematological: Negative  Psychiatric/Behavioral: Positive for sleep disturbance  The patient is nervous/anxious

## 2018-05-21 ENCOUNTER — OFFICE VISIT (OUTPATIENT)
Dept: NEUROLOGY | Facility: CLINIC | Age: 53
End: 2018-05-21
Payer: COMMERCIAL

## 2018-05-21 VITALS
DIASTOLIC BLOOD PRESSURE: 70 MMHG | HEART RATE: 80 BPM | BODY MASS INDEX: 25.34 KG/M2 | WEIGHT: 167.2 LBS | HEIGHT: 68 IN | SYSTOLIC BLOOD PRESSURE: 122 MMHG

## 2018-05-21 DIAGNOSIS — G31.9 DIFFUSE BRAIN ATROPHY (HCC): ICD-10-CM

## 2018-05-21 DIAGNOSIS — F41.9 ANXIETY AND DEPRESSION: ICD-10-CM

## 2018-05-21 DIAGNOSIS — G43.709 CHRONIC MIGRAINE WITHOUT AURA WITHOUT STATUS MIGRAINOSUS, NOT INTRACTABLE: Primary | ICD-10-CM

## 2018-05-21 DIAGNOSIS — F32.A ANXIETY AND DEPRESSION: ICD-10-CM

## 2018-05-21 DIAGNOSIS — G47.00 INSOMNIA, UNSPECIFIED TYPE: ICD-10-CM

## 2018-05-21 DIAGNOSIS — R25.1 TREMOR: ICD-10-CM

## 2018-05-21 PROCEDURE — 99214 OFFICE O/P EST MOD 30 MIN: CPT | Performed by: PHYSICIAN ASSISTANT

## 2018-05-21 NOTE — ASSESSMENT & PLAN NOTE
Preventative:  Continue Cymbalta 120 milligrams per psychiatry  Continue hydroxyzine at bedtime  Continue clonidine at bedtime  Continue clonazepam at bedtime  Use cyproheptadine 4-8 milligrams as needed for weather related headaches    Abortive:   At onset of migraine you may try prochlorperazine  Limit Tylenol and other over-the-counter headache medications to less than 3 times a week    Please call us if her headaches worsen

## 2018-05-24 ENCOUNTER — OFFICE VISIT (OUTPATIENT)
Dept: FAMILY MEDICINE CLINIC | Facility: CLINIC | Age: 53
End: 2018-05-24
Payer: COMMERCIAL

## 2018-05-24 VITALS
DIASTOLIC BLOOD PRESSURE: 70 MMHG | BODY MASS INDEX: 25.7 KG/M2 | SYSTOLIC BLOOD PRESSURE: 102 MMHG | HEART RATE: 68 BPM | WEIGHT: 169 LBS

## 2018-05-24 DIAGNOSIS — D69.6 THROMBOCYTOPENIA (HCC): ICD-10-CM

## 2018-05-24 DIAGNOSIS — F41.9 ANXIETY AND DEPRESSION: ICD-10-CM

## 2018-05-24 DIAGNOSIS — K58.0 IRRITABLE BOWEL SYNDROME WITH DIARRHEA: Primary | ICD-10-CM

## 2018-05-24 DIAGNOSIS — F32.A ANXIETY AND DEPRESSION: ICD-10-CM

## 2018-05-24 DIAGNOSIS — K57.30 DIVERTICULOSIS OF LARGE INTESTINE WITHOUT HEMORRHAGE: ICD-10-CM

## 2018-05-24 PROBLEM — Z01.818 PRE-OPERATIVE GENERAL PHYSICAL EXAMINATION: Status: RESOLVED | Noted: 2018-03-20 | Resolved: 2018-05-24

## 2018-05-24 PROCEDURE — 99214 OFFICE O/P EST MOD 30 MIN: CPT | Performed by: FAMILY MEDICINE

## 2018-05-24 RX ORDER — LIDOCAINE 50 MG/G
1 OINTMENT TOPICAL AS NEEDED
COMMUNITY

## 2018-05-24 RX ORDER — HYOSCYAMINE SULFATE 0.125 MG
0.12 TABLET ORAL EVERY 4 HOURS PRN
Qty: 30 TABLET | Refills: 0 | Status: SHIPPED | OUTPATIENT
Start: 2018-05-24 | End: 2019-04-05

## 2018-05-24 NOTE — PROGRESS NOTES
Assessment/Plan:    Diverticulosis of large intestine  Patient did have diverticulosis noted from the sigmoid to the rectum  This was on CT scan  Recommend follow-up with Gastroenterology  If she continues to have issues or problems with this, would consider General surgery consult  Currently, there does not appear to be any acute findings on the CT scan  Again, follow with GI     IBS (irritable bowel syndrome)  Patient does have a long history of irritable bowel syndrome, and is having an increase in diarrhea  Based on this, recommend trial of hyoscyamine  She reports she was on this in the past     Anxiety and depression  Stable at the moment  She does have increased stress  No changes  Continue with current treatments  Thrombocytopenia (HCC)  Thrombocytopenia was listed in the chart, however her last CBC was normal   She does have another CBC ordered  Follow-up in the future with Dr Jovana Condon  Diagnoses and all orders for this visit:    Irritable bowel syndrome with diarrhea  -     hyoscyamine (ANASPAZ,LEVSIN) 0 125 MG tablet; Take 1 tablet (0 125 mg total) by mouth every 4 (four) hours as needed for cramping    Diverticulosis of large intestine without hemorrhage    Anxiety and depression    Thrombocytopenia (HCC)    Other orders  -     lidocaine (XYLOCAINE) 5 % ointment; APPLY TO THE AFFECTED AREA ONCE EVERY NIGHT          Subjective: patient c/o daily bouts of diarrhea and would like something to control it until she can see Dr Frederick Duke in June  Patient has taken Hyoscyamine in the past which worked well  Patient ID: Gayathri Ford is a 46 y o  female  She has had a CT in past for abdominal pain  She has chronic diverticulitis  She is trying to get to GI, but has appointment in end of June  She is now having increased diarrhea  Patient did mention that she has a significant amount of stress and anxiety  That has been increased lately secondary to family stressors          The following portions of the patient's history were reviewed and updated as appropriate: allergies, current medications, past family history, past medical history, past social history, past surgical history and problem list     Review of Systems   Constitutional: Negative  HENT: Negative  Gastrointestinal: Positive for diarrhea  Negative for abdominal pain and blood in stool  Per HPI   All other systems reviewed and are negative  Objective:      /70   Pulse 68   Wt 76 7 kg (169 lb)   BMI 25 70 kg/m²          Physical Exam   Constitutional: She appears well-developed and well-nourished  HENT:   Head: Normocephalic and atraumatic  Cardiovascular: Normal rate, regular rhythm and normal heart sounds  Pulses:       Carotid pulses are 2+ on the right side, and 2+ on the left side  Pulmonary/Chest: Effort normal and breath sounds normal  She has no wheezes  She has no rales  She exhibits no tenderness  Abdominal: Soft  Bowel sounds are normal  She exhibits no distension  There is no tenderness  Nursing note and vitals reviewed

## 2018-05-24 NOTE — ASSESSMENT & PLAN NOTE
Thrombocytopenia was listed in the chart, however her last CBC was normal   She does have another CBC ordered  Follow-up in the future with Dr Alejandra Zee

## 2018-05-24 NOTE — LETTER
May 24, 2018     Roxann Ferraro, [address]    Patient: Penny Vargas   YOB: 1965   Date of Visit: 5/24/2018       Dear Dr Ted Mckeon: Thank you for referring Shanti Dobson to me for evaluation  Below are my notes for this consultation  If you have questions, please do not hesitate to call me  I look forward to following your patient along with you  Sincerely,        Albino Marie MD        CC: No Recipients  Albino Marie MD  5/24/2018 12:23 PM  Sign at close encounter  Assessment/Plan:    Diverticulosis of large intestine  Patient did have diverticulosis noted from the sigmoid to the rectum  This was on CT scan  Recommend follow-up with Gastroenterology  If she continues to have issues or problems with this, would consider General surgery consult  Currently, there does not appear to be any acute findings on the CT scan  Again, follow with GI     IBS (irritable bowel syndrome)  Patient does have a long history of irritable bowel syndrome, and is having an increase in diarrhea  Based on this, recommend trial of hyoscyamine  She reports she was on this in the past     Anxiety and depression  Stable at the moment  She does have increased stress  No changes  Continue with current treatments  Thrombocytopenia (HCC)  Thrombocytopenia was listed in the chart, however her last CBC was normal   She does have another CBC ordered  Follow-up in the future with Dr Peggy Graham  Diagnoses and all orders for this visit:    Irritable bowel syndrome with diarrhea  -     hyoscyamine (ANASPAZ,LEVSIN) 0 125 MG tablet;  Take 1 tablet (0 125 mg total) by mouth every 4 (four) hours as needed for cramping    Diverticulosis of large intestine without hemorrhage    Anxiety and depression    Thrombocytopenia (HCC)    Other orders  -     lidocaine (XYLOCAINE) 5 % ointment; APPLY TO THE AFFECTED AREA ONCE EVERY NIGHT          Subjective: patient c/o daily bouts of diarrhea and would like something to control it until she can see Dr Nini Voss in June  Patient has taken Hyoscyamine in the past which worked well  Patient ID: Nestor Gurrola is a 46 y o  female  She has had a CT in past for abdominal pain  She has chronic diverticulitis  She is trying to get to GI, but has appointment in end of June  She is now having increased diarrhea  Patient did mention that she has a significant amount of stress and anxiety  That has been increased lately secondary to family stressors  The following portions of the patient's history were reviewed and updated as appropriate: allergies, current medications, past family history, past medical history, past social history, past surgical history and problem list     Review of Systems   Constitutional: Negative  HENT: Negative  Gastrointestinal: Positive for diarrhea  Negative for abdominal pain and blood in stool  Per HPI   All other systems reviewed and are negative  Objective:      /70   Pulse 68   Wt 76 7 kg (169 lb)   BMI 25 70 kg/m²           Physical Exam   Constitutional: She appears well-developed and well-nourished  HENT:   Head: Normocephalic and atraumatic  Cardiovascular: Normal rate, regular rhythm and normal heart sounds  Pulses:       Carotid pulses are 2+ on the right side, and 2+ on the left side  Pulmonary/Chest: Effort normal and breath sounds normal  She has no wheezes  She has no rales  She exhibits no tenderness  Abdominal: Soft  Bowel sounds are normal  She exhibits no distension  There is no tenderness  Nursing note and vitals reviewed

## 2018-05-24 NOTE — PATIENT INSTRUCTIONS
Problem List Items Addressed This Visit        Digestive    IBS (irritable bowel syndrome) - Primary     Patient does have a long history of irritable bowel syndrome, and is having an increase in diarrhea  Based on this, recommend trial of hyoscyamine  She reports she was on this in the past          Relevant Medications    hyoscyamine (ANASPAZ,LEVSIN) 0 125 MG tablet       Other    Anxiety and depression     Stable at the moment  She does have increased stress  No changes  Continue with current treatments  Thrombocytopenia (HCC)     Thrombocytopenia was listed in the chart, however her last CBC was normal   She does have another CBC ordered  Follow-up in the future with Dr Stanislaw Jimenez  Diverticulosis of large intestine     Patient did have diverticulosis noted from the sigmoid to the rectum  This was on CT scan  Recommend follow-up with Gastroenterology  If she continues to have issues or problems with this, would consider General surgery consult  Currently, there does not appear to be any acute findings on the CT scan    Again, follow with GI

## 2018-05-24 NOTE — ASSESSMENT & PLAN NOTE
Patient did have diverticulosis noted from the sigmoid to the rectum  This was on CT scan  Recommend follow-up with Gastroenterology  If she continues to have issues or problems with this, would consider General surgery consult  Currently, there does not appear to be any acute findings on the CT scan    Again, follow with GI

## 2018-05-24 NOTE — ASSESSMENT & PLAN NOTE
Patient does have a long history of irritable bowel syndrome, and is having an increase in diarrhea  Based on this, recommend trial of hyoscyamine    She reports she was on this in the past

## 2018-06-04 ENCOUNTER — TELEPHONE (OUTPATIENT)
Dept: FAMILY MEDICINE CLINIC | Facility: CLINIC | Age: 53
End: 2018-06-04

## 2018-06-04 NOTE — TELEPHONE ENCOUNTER
Please have patient contact Ophthalmology as all of her medication options for irritable bowel syndrome with diarrhea have an increased risk of problems with closed angle glaucoma

## 2018-06-04 NOTE — TELEPHONE ENCOUNTER
Pt called in to inform us that pharmacy would not release hyoscyamine  Which TH prescribed at last visit  Pharmacy stated that it is not good to take if Pt has Glaucoma  Pt is requesting that another med be called in  Please call Pt once new script is sent    Thank you    Pharmacy-[facility]

## 2018-09-13 PROBLEM — S92.309A CLOSED FRACTURE OF METATARSAL BONE: Status: ACTIVE | Noted: 2017-08-24

## 2018-09-13 PROBLEM — M50.20 DISPLACEMENT OF CERVICAL INTERVERTEBRAL DISC WITHOUT MYELOPATHY: Status: ACTIVE | Noted: 2018-09-13

## 2018-09-13 PROBLEM — G56.00 CARPAL TUNNEL SYNDROME: Status: ACTIVE | Noted: 2018-09-13

## 2018-09-13 PROBLEM — M48.02 CERVICAL STENOSIS OF SPINAL CANAL: Status: ACTIVE | Noted: 2018-09-13

## 2018-09-17 ENCOUNTER — OFFICE VISIT (OUTPATIENT)
Dept: FAMILY MEDICINE CLINIC | Facility: CLINIC | Age: 53
End: 2018-09-17
Payer: COMMERCIAL

## 2018-09-17 VITALS
SYSTOLIC BLOOD PRESSURE: 118 MMHG | HEART RATE: 68 BPM | DIASTOLIC BLOOD PRESSURE: 68 MMHG | BODY MASS INDEX: 25.42 KG/M2 | WEIGHT: 167.2 LBS

## 2018-09-17 DIAGNOSIS — F41.9 ANXIETY AND DEPRESSION: Primary | ICD-10-CM

## 2018-09-17 DIAGNOSIS — F32.A ANXIETY AND DEPRESSION: Primary | ICD-10-CM

## 2018-09-17 PROCEDURE — 99213 OFFICE O/P EST LOW 20 MIN: CPT | Performed by: PHYSICIAN ASSISTANT

## 2018-09-17 PROCEDURE — 1036F TOBACCO NON-USER: CPT | Performed by: PHYSICIAN ASSISTANT

## 2018-09-17 RX ORDER — ALPRAZOLAM 0.25 MG/1
0.25 TABLET ORAL 2 TIMES DAILY PRN
Qty: 30 TABLET | Refills: 0 | Status: SHIPPED | OUTPATIENT
Start: 2018-09-17 | End: 2019-04-07 | Stop reason: SDUPTHER

## 2018-09-17 RX ORDER — BREXPIPRAZOLE 3 MG/1
TABLET ORAL
COMMUNITY
Start: 2018-07-16 | End: 2019-04-05

## 2018-09-17 NOTE — ASSESSMENT & PLAN NOTE
Xanax 0 5 mg even at 1/2 dose too strong for pt  I will Rx 0 25 and have pt try 1/2 of this  She is to [facility] sure her psychiatrist is aware of the change at her next visit

## 2018-09-17 NOTE — PROGRESS NOTES
Assessment/Plan:    Anxiety and depression  Xanax 0 5 mg even at 1/2 dose too strong for pt  I will Rx 0 25 and have pt try 1/2 of this  She is to [facility] sure her psychiatrist is aware of the change at her next visit  Diagnoses and all orders for this visit:    Anxiety and depression  -     ALPRAZolam (XANAX) 0 25 mg tablet; Take 1 tablet (0 25 mg total) by mouth 2 (two) times a day as needed for anxiety    Other orders  -     REXULTI 3 MG tablet;           Subjective:   CC: C/o increase in stress and noting panic attacks  Pt  Has an autistic child that has been having more "rage" episodes  Pt  Has xanax she uses when needed, but this makes her too sleepy and would like to discuss other options  Patient ID: Bernard Donovan is a 46 y o  female  Patient is here today because she states that her stress at home when her daughter who is only 10 gets rage episodes as she has autistic this is upsetting to her and she does try to take a Xanax however the 0 5 mg is too much for her so she takes only half in the still makes her too tired  She does see a psychiatrist and is on 120 mg of Cymbalta Rexulti and Valium 0 5 at bedtime which also makes her tired and therefore she sleeps well  She is coming here to us instead of her psychiatrist who she sees every month at the last visit was only 2 weeks ago because we prescribed the Xanax to begin with  She also sees a chronic pain specialist for pelvic pain and he mentioned to her that she should have her cortisol level checked  She did have routine blood work done in July which was basically within normal limits  The following portions of the patient's history were reviewed and updated as appropriate: allergies, current medications, past family history, past medical history, past social history, past surgical history and problem list     Review of Systems   Constitutional: Negative  HENT: Negative  Eyes: Negative  Respiratory: Negative  Cardiovascular: Negative  Gastrointestinal: Negative  Endocrine: Negative  Genitourinary: Negative  Musculoskeletal: Negative  Skin: Negative  Allergic/Immunologic: Negative  Neurological: Negative  Hematological: Negative  Psychiatric/Behavioral: The patient is nervous/anxious  Objective:      Vitals:    09/17/18 1009   BP: 118/68   BP Location: Left arm   Patient Position: Sitting   Pulse: 68   Weight: 75 8 kg (167 lb 3 2 oz)            Physical Exam   Constitutional: She is oriented to person, place, and time  She appears well-developed and well-nourished  No distress  HENT:   Head: Normocephalic and atraumatic  Eyes: Conjunctivae are normal  Right eye exhibits no discharge  Left eye exhibits no discharge  Neck: Neck supple  Carotid bruit is not present  Cardiovascular: Normal rate and regular rhythm  Pulmonary/Chest: Effort normal  No respiratory distress  Neurological: She is alert and oriented to person, place, and time  Skin: Skin is warm and dry  She is not diaphoretic  Psychiatric: She has a normal mood and affect  Judgment normal    Nursing note and vitals reviewed

## 2018-09-17 NOTE — PATIENT INSTRUCTIONS
Problem List Items Addressed This Visit        Other    Anxiety and depression - Primary     Xanax 0 5 mg even at 1/2 dose too strong for pt  I will Rx 0 25 and have pt try 1/2 of this  She is to [facility] sure her psychiatrist is aware of the change at her next visit            Relevant Medications    REXULTI 3 MG tablet    ALPRAZolam (XANAX) 0 25 mg tablet    Other Relevant Orders    Cortisol Level, AM Specimen

## 2018-10-10 ENCOUNTER — TRANSCRIBE ORDERS (OUTPATIENT)
Dept: ADMINISTRATIVE | Facility: HOSPITAL | Age: 53
End: 2018-10-10

## 2018-10-10 DIAGNOSIS — Z12.39 SCREENING FOR MALIGNANT NEOPLASM OF BREAST: Primary | ICD-10-CM

## 2018-11-05 ENCOUNTER — HOSPITAL ENCOUNTER (OUTPATIENT)
Dept: RADIOLOGY | Age: 53
Discharge: HOME | End: 2018-11-05
Payer: COMMERCIAL

## 2018-11-05 DIAGNOSIS — Z12.39 SCREENING FOR MALIGNANT NEOPLASM OF BREAST: ICD-10-CM

## 2018-11-05 PROCEDURE — 77067 SCR MAMMO BI INCL CAD: CPT

## 2019-04-01 RX ORDER — ESZOPICLONE 3 MG/1
3 TABLET, FILM COATED ORAL DAILY
Refills: 0 | COMMUNITY
Start: 2019-01-19

## 2019-04-01 RX ORDER — DULOXETIN HYDROCHLORIDE 30 MG/1
90 CAPSULE, DELAYED RELEASE ORAL DAILY
Refills: 0 | COMMUNITY
Start: 2019-01-21

## 2019-04-05 ENCOUNTER — OFFICE VISIT (OUTPATIENT)
Dept: NEUROLOGY | Facility: CLINIC | Age: 54
End: 2019-04-05
Payer: COMMERCIAL

## 2019-04-05 VITALS
HEIGHT: 68 IN | WEIGHT: 164.6 LBS | HEART RATE: 97 BPM | BODY MASS INDEX: 24.95 KG/M2 | SYSTOLIC BLOOD PRESSURE: 108 MMHG | DIASTOLIC BLOOD PRESSURE: 63 MMHG

## 2019-04-05 DIAGNOSIS — F32.A ANXIETY AND DEPRESSION: ICD-10-CM

## 2019-04-05 DIAGNOSIS — R25.1 TREMOR: ICD-10-CM

## 2019-04-05 DIAGNOSIS — G43.709 CHRONIC MIGRAINE WITHOUT AURA WITHOUT STATUS MIGRAINOSUS, NOT INTRACTABLE: Primary | ICD-10-CM

## 2019-04-05 DIAGNOSIS — F41.9 ANXIETY AND DEPRESSION: ICD-10-CM

## 2019-04-05 DIAGNOSIS — G31.9 NEURODEGENERATIVE COGNITIVE IMPAIRMENT (HCC): ICD-10-CM

## 2019-04-05 DIAGNOSIS — K58.0 IRRITABLE BOWEL SYNDROME WITH DIARRHEA: ICD-10-CM

## 2019-04-05 DIAGNOSIS — G31.9 DIFFUSE BRAIN ATROPHY (HCC): ICD-10-CM

## 2019-04-05 DIAGNOSIS — E55.9 VITAMIN D DEFICIENCY: ICD-10-CM

## 2019-04-05 PROCEDURE — 99214 OFFICE O/P EST MOD 30 MIN: CPT | Performed by: PHYSICIAN ASSISTANT

## 2019-04-05 RX ORDER — PROPRANOLOL HCL 60 MG
60 CAPSULE, EXTENDED RELEASE 24HR ORAL DAILY
Qty: 30 CAPSULE | Refills: 2 | Status: SHIPPED | OUTPATIENT
Start: 2019-04-05 | End: 2019-07-26 | Stop reason: SDUPTHER

## 2019-04-05 RX ORDER — PROCHLORPERAZINE MALEATE 10 MG
10 TABLET ORAL EVERY 8 HOURS PRN
Qty: 10 TABLET | Refills: 1 | Status: SHIPPED | OUTPATIENT
Start: 2019-04-05 | End: 2019-05-20 | Stop reason: SDUPTHER

## 2019-04-07 DIAGNOSIS — F32.A ANXIETY AND DEPRESSION: ICD-10-CM

## 2019-04-07 DIAGNOSIS — F41.9 ANXIETY AND DEPRESSION: ICD-10-CM

## 2019-04-08 RX ORDER — ALPRAZOLAM 0.25 MG/1
0.25 TABLET ORAL 2 TIMES DAILY PRN
Qty: 30 TABLET | Refills: 0 | Status: SHIPPED | OUTPATIENT
Start: 2019-04-08

## 2019-05-20 DIAGNOSIS — G43.709 CHRONIC MIGRAINE WITHOUT AURA WITHOUT STATUS MIGRAINOSUS, NOT INTRACTABLE: ICD-10-CM

## 2019-05-21 RX ORDER — PROCHLORPERAZINE MALEATE 10 MG
10 TABLET ORAL EVERY 8 HOURS PRN
Qty: 10 TABLET | Refills: 0 | Status: SHIPPED | OUTPATIENT
Start: 2019-05-21 | End: 2019-07-26 | Stop reason: SDUPTHER

## 2019-07-25 NOTE — PROGRESS NOTES
[facility] [facility]  PATIENT:  John Ribera  MRN:  [id number]  :  1965  DATE OF SERVICE:  2019      Assessment/Plan:     Chronic migraine without aura  Preventative:  Continue Cymbalta 90 mg per psychiatry  Continue hydroxyzine at bedtime  Continue clonidine at bedtime  Continue clonazepam at bedtime  Start Propranolol 30 mg (1/2 tab) in am   If unable to tolerate, please call and we will try Lamictal  Use cyproheptadine 4-8 milligrams as needed for weather related headaches     Abortive: At onset of migraine you may try prochlorperazine  Limit Tylenol and other over-the-counter headache medications to less than 3 times a week     Please call us if her headaches worsen or no improvement and we will start Emg[facility]    Diffuse brain atrophy  Unfortunately patient did not get this unfortunately, patient did not have a repeat MRI done before this visit  Therefore, we will order an MRI brain NQ  To evaluate for the diffuse atrophy that this 50-year-old female has    Neurodegenerative cognitive impairment  Need to repeat her MRI brain NQ as patient had diffuse generalized volume loss for her age  Anxiety and depression  Continue to follow up with Psychiatry    Tremor  Currently stable    Vitamin D deficiency  Continue Vitamin D supplementation         Problem List Items Addressed This Visit        Cardiovascular and Mediastinum    Chronic migraine without aura     Preventative:  Continue Cymbalta 90 mg per psychiatry  Continue hydroxyzine at bedtime  Continue clonidine at bedtime  Continue clonazepam at bedtime  Start Propranolol 30 mg (1/2 tab) in am   If unable to tolerate, please call and we will try Lamictal  Use cyproheptadine 4-8 milligrams as needed for weather related headaches     Abortive:   At onset of migraine you may try prochlorperazine  Limit Tylenol and other over-the-counter headache medications to less than 3 times a week     Please call us if her headaches worsen or no improvement and we will start Emgality         Relevant Medications    ibuprofen (MOTRIN) 200 mg tablet    acetaminophen (TYLENOL) 500 mg tablet    propranolol (INDERAL LA) 60 mg 24 hr capsule    prochlorperazine (COMPAZINE) 10 mg tablet       Nervous and Auditory    Diffuse brain atrophy     Unfortunately patient did not get this unfortunately, patient did not have a repeat MRI done before this visit  Therefore, we will order an MRI brain NQ  To evaluate for the diffuse atrophy that this 50-year-old female has         Neurodegenerative cognitive impairment - Primary     Need to repeat her MRI brain NQ as patient had diffuse generalized volume loss for her age  Relevant Orders    MRI brain NeuroQuant wo and w contrast       Other    Anxiety and depression     Continue to follow up with Psychiatry         Vitamin D deficiency     Continue Vitamin D supplementation         Tremor     Currently stable                            History of Present Illness: We had the pleasure of evaluating Jamari Romo in neurological follow up  today for headaches  As you know,  she is a 53 y o  right handedfemale  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  Her 6year old has autism  She has history of uterine cancer and is s/p full hysterectomy in 2011  She had no chemo or radiation       Complains of  tremor in her right hand   She states that it is more at rest than intentional tremor   Is not constant but can happen 3 days out of the week    has not worsened    Patient has been dealing with stress lately  Her daughter has been having behavioral issues     Memory problem:   She states she has stress in her life due to her daughter having autism  She has noted she has lost her car in the parking lot  She walks into a place and is not sure why she came in for  She has gotten to places and is not sure how she got there  For the last 2-3 years she is having more memory loss   Continues to be a problem as the stress increases in her life  However she feels this is stable      Chronic migraine headaches:   What medications do you take or have you taken for your headaches? PREVENTATIVE:  Magnesium, cyproheptadine, Depakote (tremor), Cymbalta, propranolol  Would not use Topamax due to memory problems  Would not use amitriptyline or other psychiatric medications as sees psychiatry  ABORTIVE:  Excedrin, Tylenol, prochlorperazine     Alternative therapies used in the past for headaches? massage  Headache are worse if the patient:  no  Headache trigger: Fatigue, Stress/Tension, Weather change,      Aura: none     What is your current pain level - 0/10     Any family history of migraines? No  Any family history of aneurysms? No     How often do the headaches occur  3-5 a week  What time of the day do the headaches start  varies but usually in afternoon  How long do the headaches last  4+ hours (until goes to sleep)  Where are they located  occipital and usually temporal region  Can be orbital  What is the intensity of pain  3 to 6/10   Average 4-5/10; it can get up to 8/10 once every 6 weeks or so  Describe your usual headache - Throbbing, Pounding, pressure  Associated symptoms:          Decrease of appetite, some nausea, diarrhea          Photophobia, phonophobia, sensitivity to smell          Problem with concentration         runny or stuffed-up nose         light-headed or dizzy, stiff or sore neck,          prefer to be alone and in a dark room, unable to work     Number of days missed per month because of headaches:  Work (or school) days: NA  Social or Family activities: 2        What time of the year do headaches occur more frequently?   none  Have you seen someone else for headaches or pain? Yes, PCP  Have you had trigger point injection performed and how often? No  Have you had Botox injection performed and how often? No   Have you had epidural injections or transforaminal injections performed? No     Have you used CBD or THC for your headaches and how often? No  Are you current pregnant or planning on getting pregnant? No, hysterectomy 2011 for uterine cancer  Have you ever had any Brain imaging? yes      8/22/2017MRI NQ  Volume loss, much greater than would be expected for age, prominence of all CSF-containing spaces   No acute ischemic comment cranial mass, mass effect or edema   No hemosiderin deposition   Xanthogranuloma choroid plexus stable        QUANTITATIVE:      Exam Quality: Adequate for volumetric analysis      Hippocampus     Total hippocampal volume (cc):   8 4    Percentile for similar age:      31th      Lateral ventricular volume (cc):     97 81    Percentile for similar age:     Greater than 95th      Inferior lateral vent volume (cc):    1 62    Percentile for similar age:     27th         Quantitative conclusions:        Hippocampi:                          Slightly diminished        Lateral Ventricle Volume:     Markedly prominent       Temporal Horn Volume:       Relatively diminished     Concordance between qualitative and quantitative hippocampal volume assessment: Concordant       Change in brain volumes: No previous volumetric study for comparison     Mean hippocampal volume loss among normal elderly: 0 7% per year, (-0 3 to 1 7;  Bishop 2008; also Wilfredo 2010)        VENTRICLES:  Prominent for age, reflecting diffuse generalized volume loss     SELLA AND PITUITARY GLAND:  Normal      ORBITS:  Normal      PARANASAL SINUSES:  Normal      VASCULATURE:  Evaluation of the major intracranial vasculature demonstrates appropriate flow voids      CALVARIUM AND SKULL BASE:  Normal      EXTRACRANIAL SOFT TISSUES:  Normal       I personally reviewed these images          Past Medical History:   Diagnosis Date    Endometrial cancer ([facility] [address] )     Last assessed: 2/8/17    Sinusitis     Streptococcal pharyngitis        Patient Active Problem List   Diagnosis    Allergic rhinitis    Anxiety and depression    Cataract, bilateral    Chronic migraine without aura    Chronic pelvic pain in female    Endometrial cancer ([facility] [address] )    Gastroesophageal reflux disease with esophagitis    Glaucoma    Groin mass    IBS (irritable bowel syndrome)    Insomnia    Iron deficiency anemia    Vitamin D deficiency    Diarrhea    Left lower quadrant pain    Tremor    Early menopause    Oral herpes    Thrombocytopenia (HCC)    Abnormal CBC    Diffuse brain atrophy    Diverticulosis of large intestine    Carpal tunnel syndrome    Closed fracture of metatarsal bone    Displacement of cervical intervertebral disc without myelopathy    Cervical stenosis of spinal canal    Neurodegenerative cognitive impairment       Medications:      Current Outpatient Medications   Medication Sig Dispense Refill    acetaminophen (TYLENOL) 500 mg tablet Take 1,000 mg by mouth as needed for mild pain (5-6 times a week)      ALPHAGAN P 0 1 % INSERT 1 DROPS OPHTHALMICALLY EVERY 12 HOURS INTO BOTH EYES  1    ALPRAZolam (XANAX) 0 25 mg tablet TAKE 1 TABLET (0 25 MG TOTAL) BY MOUTH 2 (TWO) TIMES A DAY AS NEEDED FOR ANXIETY 30 tablet 0    Cephalexin 500 MG tablet TAKE ONE PILL THREE TIMES A DAY FOR ONE WEEK  0    clonazePAM (KlonoPIN) 0 5 mg tablet       cloNIDine (CATAPRES) 0 2 mg tablet TAKE 1/2 TABLET AT 5PM AND 1 TABLET AT BEDTIME  0    CYPROHEPTADINE HCL PO Take 4 mg by mouth as needed      DULoxetine (CYMBALTA) 30 mg delayed release capsule Take 90 mg by mouth daily  0    estradiol (ESTRACE) 0 1 mg/g vaginal cream 1 GM VAGINALLY TWICE WEEKLY AT BEDTIME    3    eszopiclone (LUNESTA) 3 MG tablet Take 3 mg by mouth daily  0    hydrOXYzine HCL (ATARAX) 50 mg tablet TAKE 2 TABS BYMOUTH AT BEDTIME  0    ibuprofen (MOTRIN) 200 mg tablet Take 400 mg by mouth as needed for mild pain      lidocaine (XYLOCAINE) 5 % ointment APPLY TO THE AFFECTED AREA ONCE EVERY NIGHT      prochlorperazine (COMPAZINE) 10 mg tablet Take 1 tablet (10 mg total) by mouth every 8 (eight) hours as needed (migraine) 10 tablet 0    travoprost (TRAVATAN Z) 0 004 % ophthalmic solution Travatan Z 0 004 % eye drops      triamcinolone (KENALOG) 0 1 % cream APPLY TO SKIN TWICE DAILY X 1-2 WEEKS THEN AS NEEDED FOR RASH  1    valACYclovir (VALTREX) 500 mg tablet Take 1 tablet by mouth daily      cloNIDine (CATAPRES) 0 1 mg tablet 1 &1/2 TABS AT BEDTIME  0    L-Methylfolate-Algae (DEPLIN 15) 15-90 314 MG CAPS Take 1 capsule by mouth daily  0    Multiple Vitamins-Minerals (EYE SUPPORT PO) Take by mouth daily      propranolol (INDERAL LA) 60 mg 24 hr capsule Take 1 capsule (60 mg total) by mouth daily 1/2 tablet in am 30 capsule 2     No current facility-administered medications for this visit           Allergies:    No Known Allergies    Family History:     Family History   Problem Relation Age of Onset    Hypertension Mother     Breast cancer Mother     Other Father         pace maker     Prostate cancer Father     Anxiety disorder Sister     Depression Sister        Social History:     Social History     Socioeconomic History    Marital status: /Civil Union     Spouse name: Not on file    Number of children: Not on file    Years of education: Not on file    Highest education level: Not on file   Occupational History    Occupation: Housewife or [address] resource strain: Not on file    Food insecurity:     Worry: Not on file     Inability: Not on file    Transportation needs:     Medical: Not on file     Non-medical: Not on file   Tobacco Use    Smoking status: Never Smoker    Smokeless tobacco: Never Used    Tobacco comment: No second hand smoke exposure   Substance and Sexual Activity    Alcohol use: No    Drug use: No    Sexual activity: Not on file   Lifestyle    Physical activity:     Days per week: Not on file     Minutes per session: Not on file    Stress: Not on file   Relationships    Social connections:     Talks on phone: Not on file     Gets together: Not on file     Attends Hinduism service: Not on file     Active member of club or organization: Not on file     Attends meetings of clubs or organizations: Not on file     Relationship status: Not on file    Intimate partner violence:     Fear of current or ex partner: Not on file     Emotionally abused: Not on file     Physically abused: Not on file     Forced sexual activity: Not on file   Other Topics Concern    Not on file   Social History Narrative    Reading         Objective:   Physical Exam:                                                                   Vitals:            /74 (BP Location: Left arm, Patient Position: Sitting, Cuff Size: Adult)   Pulse 92   Ht 5' 8" (1 727 m)   Wt 72 6 kg (160 lb)   BMI 24 33 kg/m²   BP Readings from Last 3 Encounters:   07/26/19 118/74   04/05/19 108/63   09/17/18 118/68     Pulse Readings from Last 3 Encounters:   07/26/19 92   04/05/19 97   09/17/18 68                  CONSTITUTIONAL: Well developed, well nourished, well groomed  No dysmorphic features  Eyes:  PERRLA, EOM normal      Neck:  Normal ROM, neck supple  HEENT:  Normocephalic atraumatic  No meningismus  Oropharynx is clear and moist  No oral mucosal lesions  Chest:  Respirations regular and unlabored  Cardiovascular:  Distal extremities warm without palpable edema or tenderness, no observed significant swelling  Musculoskeletal:  Full range of motion  (see below under neurologic exam for evaluation of motor function and gait)   Skin:  warm and dry   Psychiatric:  Normal behavior and appropriate affect      SKULL AND SPINE  ROM: Full range of motion  Tenderness: No focal tenderness    MENTAL STATUS  Orientation: Alert and oriented x 3  Fund of knowledge: Intact  CRANIAL NERVES  II: PERRL  Visual fields full  III, IV, VI: Extraocular movements intact  No nystagmus    V: Facial sensation normal V1-V3  VII: Facial movements normal and symmetric  VIII: Intact hearing bilaterally  IX, X: Palate elevation normal and symmetric  XI: Intact trapezius, SCM strength  XII: Tongue protrudes to the midline    MOTOR (Upper and lower extremities)   Bulk/tone/abnormal movement: Normal muscle bulk and tone  Strength: Strength 5/5 throughout  COORDINATION   Station/Gait: Normal baseline gait  SENSORY  Romberg sign absent  Reflexes:  deep tendon reflexes are 2+/4 throughout, flexor response bilaterally         Review of Systems:   Review of Systems   Constitutional: Negative  HENT: Negative  Eyes: Negative  Respiratory: Negative  Cardiovascular: Negative  Gastrointestinal: Negative  Endocrine: Negative  Genitourinary: Negative  Musculoskeletal: Positive for back pain (lower back )  Skin: Negative  Allergic/Immunologic: Negative  Neurological: Negative  Hematological: Negative  Psychiatric/Behavioral: The patient is nervous/anxious          I personally reviewed and updated the ROS that was entered by the medical assistant           Author:  Cristal Sullivan PA-C 7/26/2019 1:57 PM

## 2019-07-26 ENCOUNTER — OFFICE VISIT (OUTPATIENT)
Dept: NEUROLOGY | Facility: CLINIC | Age: 54
End: 2019-07-26
Payer: COMMERCIAL

## 2019-07-26 VITALS
BODY MASS INDEX: 24.25 KG/M2 | SYSTOLIC BLOOD PRESSURE: 118 MMHG | HEIGHT: 68 IN | HEART RATE: 92 BPM | DIASTOLIC BLOOD PRESSURE: 74 MMHG | WEIGHT: 160 LBS

## 2019-07-26 DIAGNOSIS — F41.9 ANXIETY AND DEPRESSION: ICD-10-CM

## 2019-07-26 DIAGNOSIS — F32.A ANXIETY AND DEPRESSION: ICD-10-CM

## 2019-07-26 DIAGNOSIS — E55.9 VITAMIN D DEFICIENCY: ICD-10-CM

## 2019-07-26 DIAGNOSIS — G31.9 NEURODEGENERATIVE COGNITIVE IMPAIRMENT (HCC): Primary | ICD-10-CM

## 2019-07-26 DIAGNOSIS — G31.9 DIFFUSE BRAIN ATROPHY (HCC): ICD-10-CM

## 2019-07-26 DIAGNOSIS — G43.709 CHRONIC MIGRAINE WITHOUT AURA WITHOUT STATUS MIGRAINOSUS, NOT INTRACTABLE: ICD-10-CM

## 2019-07-26 DIAGNOSIS — R25.1 TREMOR: ICD-10-CM

## 2019-07-26 PROCEDURE — 99214 OFFICE O/P EST MOD 30 MIN: CPT | Performed by: PHYSICIAN ASSISTANT

## 2019-07-26 RX ORDER — PROCHLORPERAZINE MALEATE 10 MG
10 TABLET ORAL EVERY 8 HOURS PRN
Qty: 10 TABLET | Refills: 0 | Status: SHIPPED | OUTPATIENT
Start: 2019-07-26

## 2019-07-26 RX ORDER — BRIMONIDINE TARTRATE 0.1 %
DROPS OPHTHALMIC (EYE)
Refills: 1 | COMMUNITY
Start: 2019-04-29

## 2019-07-26 RX ORDER — ESTRADIOL 0.1 MG/G
2 CREAM VAGINAL AS NEEDED
Refills: 3 | COMMUNITY
Start: 2019-06-24

## 2019-07-26 RX ORDER — CLONIDINE HYDROCHLORIDE 0.2 MG/1
0.2 TABLET ORAL DAILY
Refills: 0 | COMMUNITY
Start: 2019-07-02

## 2019-07-26 RX ORDER — TRIAMCINOLONE ACETONIDE 1 MG/G
CREAM TOPICAL
Refills: 1 | COMMUNITY
Start: 2019-05-20

## 2019-07-26 RX ORDER — ACETAMINOPHEN 500 MG
1000 TABLET ORAL AS NEEDED
COMMUNITY

## 2019-07-26 RX ORDER — CEPHALEXIN 500 MG/1
TABLET ORAL
Refills: 0 | COMMUNITY
Start: 2019-07-17 | End: 2019-12-04 | Stop reason: ALTCHOICE

## 2019-07-26 RX ORDER — PROPRANOLOL HCL 60 MG
60 CAPSULE, EXTENDED RELEASE 24HR ORAL DAILY
Qty: 30 CAPSULE | Refills: 2 | Status: SHIPPED | OUTPATIENT
Start: 2019-07-26 | End: 2019-10-22 | Stop reason: SDUPTHER

## 2019-07-26 RX ORDER — TRAVOPROST OPHTHALMIC SOLUTION 0.04 MG/ML
SOLUTION OPHTHALMIC
COMMUNITY
Start: 2017-10-01 | End: 2019-12-04

## 2019-07-26 RX ORDER — IBUPROFEN 200 MG
400 TABLET ORAL AS NEEDED
COMMUNITY

## 2019-07-26 NOTE — PATIENT INSTRUCTIONS
Preventative:  Continue Cymbalta 90 mg per psychiatry  Continue hydroxyzine at bedtime  Continue clonidine at bedtime  Continue clonazepam at bedtime  Start Propranolol 30 mg (1/2 tab) in am   If unable to tolerate, please call and we will try Lamictal  Use cyproheptadine 4-8 milligrams as needed for weather related headaches    Abortive: At onset of migraine you may try prochlorperazine  Limit Tylenol and other over-the-counter headache medications to less than 3 times a week    Please call us if her headaches worsen    We will have you repeat the MRI NQ     May take these over-the-counter supplements to decrease intensity and frequency of migraines  - Magnesium Oxide 400-500 mg a day  If any diarrhea or upset stomach, decrease dose  as tolerated  -  B2 200 mg a day  May take once a day in am  This supplement will change the color of the urine to fluorescent yellow no matter how hydrated, which is normal      Discussed side effects of all medications prescribed today to the patient in detail  Patient education was completed today and we also discussed precautions for rebound headaches  When patient has a moderate to severe headache, they should seek rest, initiate relaxation and apply cold compresses to the head  1  Maintain regular sleep schedule  Adults need at least 7-8 hours of uninterrupted a night  2  Limit over the counter medications such as Tylenol, Ibuprofen, Aleve, Excedrin  (No more than 3 times a week)  3  Maintain headache diary  We discussed an UBALDO for a smart phone is "Migraine buddy"  4  Limit caffeine to 1-2 cups a day or less  5  Avoid dietary trigger  (list given to the patient and reviewed with them)  6  Patient is to have regular frequent meals to prevent headache onset  7   Please drink at least 64 ounces of water a day to help remain hydrated

## 2019-07-26 NOTE — ASSESSMENT & PLAN NOTE
Preventative:  Continue Cymbalta 90 mg per psychiatry  Continue hydroxyzine at bedtime  Continue clonidine at bedtime  Continue clonazepam at bedtime  Start Propranolol 30 mg (1/2 tab) in am   If unable to tolerate, please call and we will try Lamictal  Use cyproheptadine 4-8 milligrams as needed for weather related headaches     Abortive:   At onset of migraine you may try prochlorperazine  Limit Tylenol and other over-the-counter headache medications to less than 3 times a week     Please call us if her headaches worsen or no improvement and we will start [facility]

## 2019-07-26 NOTE — ASSESSMENT & PLAN NOTE
Unfortunately patient did not get this unfortunately, patient did not have a repeat MRI done before this visit    Therefore, we will order an MRI brain NQ  To evaluate for the diffuse atrophy that this 58-year-old female has

## 2019-08-13 ENCOUNTER — HOSPITAL ENCOUNTER (OUTPATIENT)
Dept: MRI IMAGING | Facility: HOSPITAL | Age: 54
Discharge: HOME | End: 2019-08-13
Payer: COMMERCIAL

## 2019-08-13 DIAGNOSIS — G31.9 NEURODEGENERATIVE COGNITIVE IMPAIRMENT (HCC): ICD-10-CM

## 2019-08-13 PROCEDURE — 70553 MRI BRAIN STEM W/O & W/DYE: CPT

## 2019-08-13 PROCEDURE — A9585 GADOBUTROL INJECTION: HCPCS | Performed by: PHYSICIAN ASSISTANT

## 2019-08-13 RX ADMIN — GADOBUTROL 7 ML: 604.72 INJECTION INTRAVENOUS at 10:45

## 2019-10-22 DIAGNOSIS — G43.709 CHRONIC MIGRAINE WITHOUT AURA WITHOUT STATUS MIGRAINOSUS, NOT INTRACTABLE: ICD-10-CM

## 2019-10-22 RX ORDER — PROPRANOLOL HCL 60 MG
60 CAPSULE, EXTENDED RELEASE 24HR ORAL DAILY
Qty: 90 CAPSULE | Refills: 0 | Status: SHIPPED | OUTPATIENT
Start: 2019-10-22 | End: 2021-03-25

## 2019-11-04 ENCOUNTER — DOCUMENTATION (OUTPATIENT)
Dept: NEUROLOGY | Facility: CLINIC | Age: 54
End: 2019-11-04

## 2019-11-04 NOTE — PROGRESS NOTES
Received request for medical records from [address] [place] [address]    Scanned request into chart/media and will send to [facility] at end of day 11/4/19

## 2019-11-07 ENCOUNTER — HOSPITAL ENCOUNTER (OUTPATIENT)
Dept: RADIOLOGY | Age: 54
Discharge: HOME | End: 2019-11-07
Payer: COMMERCIAL

## 2019-11-07 VITALS — HEIGHT: 68 IN | WEIGHT: 160 LBS | BODY MASS INDEX: 24.25 KG/M2

## 2019-11-07 DIAGNOSIS — Z12.31 ENCOUNTER FOR SCREENING MAMMOGRAM FOR MALIGNANT NEOPLASM OF BREAST: ICD-10-CM

## 2019-11-07 PROCEDURE — 77067 SCR MAMMO BI INCL CAD: CPT

## 2019-12-02 NOTE — PROGRESS NOTES
[facility]  PATIENT:  Ford Petty  MRN:  [id number]  :  1965  DATE OF SERVICE:  2019      Assessment/Plan:     No problem-specific Assessment & Plan notes found for this encounter  {Assess/PlanSmartLinks:01418}            History of Present Illness: We had the pleasure of evaluating Ford Petty in neurological follow up  today for headaches  As you know,  {He/she (caps):79011} is a 53 y o  {RIGHT/LEFT:} handedfemale  Current medical illnesses:  - QTC  - Last date of colonoscopy? History Tobacco use:    Headaches:  What medications do you take or have you taken for your headaches? PREVENTATIVE:  ***  ABORTIVE:  ***    What is your current pain level - ***    How often do the headaches occur? ***    Are you ever headache free? {YES [id number]    Aura/warning and how long does it last - Halos around lights, Upset stomach, Neck pain, Light headed, Blind spot, Loss of vision Dizziness, Lethargy, Flashing light, weakness/numbness , Mood swings, Scotomas    What time of the day do the headaches start? {Time; of day:73997}    How long do the headaches last? ***    Describe your usual headache - Throbbing, Pounding, Pressure, Searing, Sharp, Shooting, Stabbing, Electric, Burning, Nagging, Aching, Dull, Tight band    Where is your headache located? What is the intensity of pain?  {Pain severity:06454}    Associated symptoms:   - Decrease of appetite, nausea, vomiting, diarrhea  - Photophobia, phonophobia, sensitivity to smell   - Problem with concentration  - Blurred vision, change in pupil size, Ptosis, facial droop  - Red ear   - Lacrimation, runny or stuffed-up nose, flushing of face  - Tinnitus, light-headed or dizzy, stiff or sore neck,   - Hands or feet tingle or feel numb, prefer to be alone and in a dark room, unable to work    Number of days missed per month because of headaches:  Work (or school) days: ***  Social or Family activities: ***    Alternative therapies used in the past for headaches? {Therapies; headache:85508}  Headache are worse if the patient: cough, sneeze, bending over, moving bowel, running or exercising, walking, climbing stair, with sexual activity  Headache triggers:  {causes; headache:40480}    What time of the year do headaches occur more frequently? {Headache time related:54251}  Have you seen someone else for headaches or pain? {YES /WC:76383}  Have you had trigger point injection performed and how often? {YES /GE:69782}  Have you had Botox injection performed and how often? {YES /IW:31063}   Have you had epidural injections or transforaminal injections performed? {YES /MP:99627}    Have you used CBD or THC for your headaches and how often? {YES [id number]    Are you current pregnant or planning on getting pregnant? {YES /ZE:13619}    Have you ever had any Brain imaging? {Yes     PJ:28503} {reviewed images:10830}    - Reviewed old notes from physician seen in the past- see above HPI for summary of previous encounters       Past Medical History:   Diagnosis Date    Endometrial cancer ([facility] ) 2011    Last assessed: 2/8/17; age 51    Sinusitis     Streptococcal pharyngitis        Patient Active Problem List   Diagnosis    Allergic rhinitis    Anxiety and depression    Cataract, bilateral    Chronic migraine without aura    Chronic pelvic pain in female    Endometrial cancer ([facility] )    Gastroesophageal reflux disease with esophagitis    Glaucoma    Groin mass    IBS (irritable bowel syndrome)    Insomnia    Iron deficiency anemia    Vitamin D deficiency    Diarrhea    Left lower quadrant pain    Tremor    Early menopause    Oral herpes    Thrombocytopenia (HCC)    Abnormal CBC    Diffuse brain atrophy (HCC)    Diverticulosis of large intestine    Carpal tunnel syndrome    Closed fracture of metatarsal bone    Displacement of cervical intervertebral disc without myelopathy    Cervical stenosis of spinal canal    Neurodegenerative cognitive impairment (HCC)       Medications:      Current Outpatient Medications   Medication Sig Dispense Refill    acetaminophen (TYLENOL) 500 mg tablet Take 1,000 mg by mouth as needed for mild pain (5-6 times a week)      ALPHAGAN P 0 1 % INSERT 1 DROPS OPHTHALMICALLY EVERY 12 HOURS INTO BOTH EYES  1    ALPRAZolam (XANAX) 0 25 mg tablet TAKE 1 TABLET (0 25 MG TOTAL) BY MOUTH 2 (TWO) TIMES A DAY AS NEEDED FOR ANXIETY 30 tablet 0    Cephalexin 500 MG tablet TAKE ONE PILL THREE TIMES A DAY FOR ONE WEEK  0    clonazePAM (KlonoPIN) 0 5 mg tablet       cloNIDine (CATAPRES) 0 1 mg tablet 1 &1/2 TABS AT BEDTIME  0    cloNIDine (CATAPRES) 0 2 mg tablet TAKE 1/2 TABLET AT 5PM AND 1 TABLET AT BEDTIME  0    CYPROHEPTADINE HCL PO Take 4 mg by mouth as needed      DULoxetine (CYMBALTA) 30 mg delayed release capsule Take 90 mg by mouth daily  0    estradiol (ESTRACE) 0 1 mg/g vaginal cream 1 GM VAGINALLY TWICE WEEKLY AT BEDTIME    3    eszopiclone (LUNESTA) 3 MG tablet Take 3 mg by mouth daily  0    hydrOXYzine HCL (ATARAX) 50 mg tablet TAKE 2 TABS BYMOUTH AT BEDTIME  0    ibuprofen (MOTRIN) 200 mg tablet Take 400 mg by mouth as needed for mild pain      L-Methylfolate-Algae (DEPLIN 15) 15-90 314 MG CAPS Take 1 capsule by mouth daily  0    lidocaine (XYLOCAINE) 5 % ointment APPLY TO THE AFFECTED AREA ONCE EVERY NIGHT      Multiple Vitamins-Minerals (EYE SUPPORT PO) Take by mouth daily      prochlorperazine (COMPAZINE) 10 mg tablet Take 1 tablet (10 mg total) by mouth every 8 (eight) hours as needed (migraine) 10 tablet 0    propranolol (INDERAL LA) 60 mg 24 hr capsule TAKE 1 CAPSULE (60 MG TOTAL) BY MOUTH DAILY 1/2 TABLET IN AM 90 capsule 0    travoprost (TRAVATAN Z) 0 004 % ophthalmic solution Travatan Z 0 004 % eye drops      triamcinolone (KENALOG) 0 1 % cream APPLY TO SKIN TWICE DAILY X 1-2 WEEKS THEN AS NEEDED FOR RASH  1    valACYclovir (VALTREX) 500 mg tablet Take 1 tablet by mouth daily       No current facility-administered medications for this visit           Allergies:    No Known Allergies    Family History:     Family History   Problem Relation Age of Onset    Hypertension Mother     Breast cancer Mother 52    Other Father         pace maker     Prostate cancer Father 72    Colon cancer Father 68    Anxiety disorder Sister     Depression Sister     No Known Problems Daughter     No Known Problems Daughter     No Known Problems Maternal Grandmother     No Known Problems Maternal Grandfather     No Known Problems Paternal Grandmother     No Known Problems Paternal Grandfather     No Known Problems Maternal Aunt     No Known Problems Maternal Aunt     No Known Problems Paternal Aunt        Social History:     Social History     Socioeconomic History    Marital status: /Civil Union     Spouse name: Not on file    Number of children: Not on file    Years of education: Not on file    Highest education level: Not on file   Occupational History    Occupation: Housewife or Homemaker   Social Needs    Financial resource strain: Not on file    Food insecurity:     Worry: Not on file     Inability: Not on file    Transportation needs:     Medical: Not on file     Non-medical: Not on file   Tobacco Use    Smoking status: Never Smoker    Smokeless tobacco: Never Used    Tobacco comment: No second hand smoke exposure   Substance and Sexual Activity    Alcohol use: No    Drug use: No    Sexual activity: Not on file   Lifestyle    Physical activity:     Days per week: Not on file     Minutes per session: Not on file    Stress: Not on file   Relationships    Social connections:     Talks on phone: Not on file     Gets together: Not on file     Attends Yazidism service: Not on file     Active member of club or organization: Not on file     Attends meetings of clubs or organizations: Not on file     Relationship status: Not on file    Intimate partner violence: Fear of current or ex partner: Not on file     Emotionally abused: Not on file     Physically abused: Not on file     Forced sexual activity: Not on file   Other Topics Concern    Not on file   Social History Narrative    Reading         Objective:   Physical Exam:                                                                   Vitals: There were no vitals taken for this visit  BP Readings from Last 3 Encounters:   07/26/19 118/74   04/05/19 108/63   09/17/18 118/68     Pulse Readings from Last 3 Encounters:   07/26/19 92   04/05/19 97   09/17/18 68                     Review of Systems:   Review of Systems    I personally reviewed and updated the ROS that was entered by the medical assistant       I have spent *** minutes with {Patient /Family:67076} today in which greater than 50% of this time was spent in counseling/coordination of care regarding {AMB Counseling Topics:[id number]}        Author:  Jennifer Hernandez 12/2/2019 8:07 AM

## 2019-12-03 NOTE — PROGRESS NOTES
[address] [facility]  PATIENT:  Debi Roque  MRN:  [id number]  :  1965  DATE OF SERVICE:  2019      Assessment/Plan:     Chronic migraine without aura  Preventative:  Continue Cymbalta 90 mg per psychiatry  Continue hydroxyzine at bedtime  Continue clonidine at bedtime  Continue clonazepam at bedtime  Wean off Propranolol 20 mg  am for 7 days then stop  Use cyproheptadine 4-8 milligrams as needed for weather related headaches  Start Emgality 2 injections first month then 1 injection every 30 days    Abortive: At onset of migraine you may try prochlorperazine  Limit Tylenol and other over-the-counter headache medications to less than 3 doses a week! On the day you are going to start the injections, take Decadron 1mg in am for 5 days then Olanzapine 5 mg at bedtime for 5 nights  Do not use Tylenol, ibuprofen, excedrin etc during these days  Talk to your psychiatrist and/or psychologist about cognitive behavior therapy and biofeedback for migraines    Diffuse brain atrophy  MRI stable from 2017  No apparent increase in atrophy    Anxiety and depression  Continue to work closely with psychiatry and psychology  Discussed CBT and biofeedback with patient    Vitamin D deficiency  Continue with Vitamin D supplementation         Problem List Items Addressed This Visit        Cardiovascular and Mediastinum    Chronic migraine without aura - Primary     Preventative:  Continue Cymbalta 90 mg per psychiatry  Continue hydroxyzine at bedtime  Continue clonidine at bedtime  Continue clonazepam at bedtime  Wean off Propranolol 20 mg  am for 7 days then stop  Use cyproheptadine 4-8 milligrams as needed for weather related headaches  Start Emgality 2 injections first month then 1 injection every 30 days    Abortive: At onset of migraine you may try prochlorperazine  Limit Tylenol and other over-the-counter headache medications to less than 3 doses a week!     On the day you are going to start the injections, take Decadron 1mg in am for 5 days then Olanzapine 5 mg at bedtime for 5 nights  Do not use Tylenol, ibuprofen, excedrin etc during these days  Talk to your psychiatrist and/or psychologist about cognitive behavior therapy and biofeedback for migraines         Relevant Medications    Galcanezumab-gnlm 120 MG/ML SOAJ    Galcanezumab-gnlm 120 MG/ML SOAJ    propranolol (INDERAL) 20 mg tablet    dexamethasone (DECADRON) 1 mg tablet    OLANZapine (ZyPREXA) 5 mg tablet    cyproheptadine (PERIACTIN) 4 mg tablet       Nervous and Auditory    Diffuse brain atrophy ([address] )     MRI stable from 2017  No apparent increase in atrophy            Other    Anxiety and depression     Continue to work closely with psychiatry and psychology  Discussed CBT and biofeedback with patient         Relevant Medications    OLANZapine (ZyPREXA) 5 mg tablet    Vitamin D deficiency     Continue with Vitamin D supplementation                   History of Present Illness: We had the pleasure of evaluating Delon Blackman in neurological follow up  today for headaches  As you know,  she is a 53 y o  right handedfemale  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 age 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  Her 6year old has autism  She has history of uterine cancer and is s/p full hysterectomy in 2011  She had no chemo or radiation       QTc 3/21/2018 415 ms    Patient has been dealing with stress lately  Her daughter has been having behavioral issues and is in a residental program now        Memory problem:   She states she has stress in her life due to her daughter having autism  She has noted she has lost her car in the parking lot  She walks into a place and is not sure why she came in for  She has gotten to places and is not sure how she got there  For the last 2-3 years she is having more memory loss   Continues to be a problem as the stress increases in her life  Ravindra Hanna she feels this is stable      Chronic migraine headaches:   What medications do you take or have you taken for your headaches? PREVENTATIVE:  Magnesium, cyproheptadine, Depakote (tremor), Cymbalta, propranolol  Would not use Topamax due to memory problems  Would not use amitriptyline or other psychiatric medications as sees psychiatry  ABORTIVE:  Excedrin, Tylenol, prochlorperazine     Alternative therapies used in the past for headaches? massage  Headache are worse if the patient:  no  Headache trigger: Fatigue, Stress/Tension, Weather change,      Aura: none     What is your current pain level - 1/10     Any family history of migraines? No  Any family history of aneurysms? No     How often do the headaches occur - 4-5 a week  What time of the day do the headaches start - varies but usually in afternoon  How long do the headaches last - 4+ hours (until goes to sleep)  Where are they located - occipital and usually temporal region   Can be orbital  What is the intensity of pain - 3 to 6/10 -  Average 4-5/10; it can get up to 8/10 but last time was in September 2019  Describe your usual headache - Throbbing, Pounding, pressure  Associated symptoms:          Decrease of appetite, some nausea, diarrhea          Photophobia, phonophobia, sensitivity to smell          Problem with concentration         runny or stuffed-up nose         light-headed or dizzy, stiff or sore neck,          prefer to be alone and in a dark room, unable to work     Number of days missed per month because of headaches:  Work (or school) days: NA  Social or Family activities: 2        What time of the year do headaches occur more frequently?   none  Have you seen someone else for headaches or pain? Yes, PCP  Have you had trigger point injection performed and how often? No  Have you had Botox injection performed and how often? No   Have you had epidural injections or transforaminal injections performed? No     Have you used CBD or THC for your headaches and how often? No  Are you current pregnant or planning on getting pregnant? No, hysterectomy 2011 for uterine cancer  Have you ever had any Brain imaging? yes      8/22/2017MRI NQ  Volume loss, much greater than would be expected for age, prominence of all CSF-containing spaces   No acute ischemic comment cranial mass, mass effect or edema   No hemosiderin deposition   Xanthogranuloma choroid plexus stable        QUANTITATIVE:      Exam Quality: Adequate for volumetric analysis      Hippocampus     Total hippocampal volume (cc):   8 4    Percentile for similar age:      31th      Lateral ventricular volume (cc):     96 61    Percentile for similar age:     Greater than 95th      Inferior lateral vent volume (cc):    1 62    Percentile for similar age:     27th         Quantitative conclusions:        Hippocampi:                          Slightly diminished        Lateral Ventricle Volume:     Markedly prominent       Temporal Horn Volume:       Relatively diminished     Concordance between qualitative and quantitative hippocampal volume assessment: Concordant       Change in brain volumes: No previous volumetric study for comparison     Mean hippocampal volume loss among normal elderly: 0 7% per year, (-0 3 to 1 7;  Bishop 2008; also Wilfredo 2010)     VENTRICLES:  Prominent for age, reflecting diffuse generalized volume loss     SELLA AND PITUITARY GLAND:  Normal      ORBITS:  Normal      PARANASAL SINUSES:  Normal      VASCULATURE:  Evaluation of the major intracranial vasculature demonstrates appropriate flow voids      CALVARIUM AND SKULL BASE:  Normal      EXTRACRANIAL SOFT TISSUES:  Normal      8/13/2019 MRI NQ brain  No acute intracranial abnormality      NeuroQuant analysis was performed: Normal hippocampal volume and enlarged superior lateral ventricular system: does not support hippocampal degeneration    Possible expansion of ventricular system without medial temporal lobe focused ex-vacuo process       I personally reviewed these images      - Reviewed old notes from physician seen in the past- see above HPI for summary of previous encounters  Past Medical History:   Diagnosis Date    Endometrial cancer ([facility] ) 2011    Last assessed: 2/8/17; age 51    Sinusitis     Streptococcal pharyngitis        Patient Active Problem List   Diagnosis    Allergic rhinitis    Anxiety and depression    Cataract, bilateral    Chronic migraine without aura    Chronic pelvic pain in female    Endomet[facility] ([facility] )    Gastroesophageal reflux disease with esophagitis    Glaucoma    Groin mass    IBS (irritable bowel syndrome)    Insomnia    Iron deficiency anemia    Vitamin D deficiency    Diarrhea    Left lower quadrant pain    Tremor    Early menopause    Oral herpes    Thrombocytopenia (HCC)    Abnormal CBC    Diffuse brain atrophy (HCC)    Diverticulosis of large intestine    Carpal tunnel syndrome    Closed fracture of metatarsal bone    Displacement of cervical intervertebral disc without myelopathy    Cervical stenosis of spinal canal    Neurodegenerative cognitive impairment (HCC)       Medications:      Current Outpatient Medications   Medication Sig Dispense Refill    acetaminophen (TYLENOL) 500 mg tablet Take 1,000 mg by mouth as needed for mild pain (5-6 times a week)      ALPHAGAN P 0 1 % INSERT 1 DROPS OPHTHALMICALLY EVERY 12 HOURS INTO BOTH EYES  1    ALPRAZolam (XANAX) 0 25 mg tablet TAKE 1 TABLET (0 25 MG TOTAL) BY MOUTH 2 (TWO) TIMES A DAY AS NEEDED FOR ANXIETY 30 tablet 0    clonazePAM (KlonoPIN) 0 5 mg tablet       cloNIDine (CATAPRES) 0 2 mg tablet TAKE 1/2 TABLET AT 5PM AND 1 TABLET AT BEDTIME  0    DULoxetine (CYMBALTA) 30 mg delayed release capsule Take 90 mg by mouth daily  0    estradiol (ESTRACE) 0 1 mg/g vaginal cream 1 GM VAGINALLY TWICE WEEKLY AT BEDTIME    3    eszopiclone (LUNESTA) 3 MG tablet Take 3 mg by mouth daily  0    hydrOXYzine HCL (ATARAX) 50 mg tablet TAKE 2 TABS BYMOUTH AT BEDTIME  0    ibuprofen (MOTRIN) 200 mg tablet Take 400 mg by mouth as needed for mild pain      lidocaine (XYLOCAINE) 5 % ointment APPLY TO THE AFFECTED AREA ONCE EVERY NIGHT      Multiple Vitamins-Minerals (EYE SUPPORT PO) Take by mouth daily      prochlorperazine (COMPAZINE) 10 mg tablet Take 1 tablet (10 mg total) by mouth every 8 (eight) hours as needed (migraine) 10 tablet 0    propranolol (INDERAL LA) 60 mg 24 hr capsule TAKE 1 CAPSULE (60 MG TOTAL) BY MOUTH DAILY 1/2 TABLET IN AM 90 capsule 0    triamcinolone (KENALOG) 0 1 % cream APPLY TO SKIN TWICE DAILY X 1-2 WEEKS THEN AS NEEDED FOR RASH  1    valACYclovir (VALTREX) 500 mg tablet Take 1 tablet by mouth daily      cloNIDine (CATAPRES) 0 1 mg tablet 1 &1/2 TABS AT BEDTIME  0    cyproheptadine (PERIACTIN) 4 mg tablet Take 1 tablet (4 mg total) by mouth daily at bedtime as needed for allergies (headaches) 30 tablet 0    dexamethasone (DECADRON) 1 mg tablet Take 1 tablet (1 mg total) by mouth daily with breakfast 5 tablet 0    Galcanezumab-gnlm 120 MG/ML SOAJ Inject 120 mg under the skin every 30 (thirty) days Starting in second month after loading dose 1 pen 11    Galcanezumab-gnlm 120 MG/ML SOAJ Inject 240 mg under the skin once for 1 dose 2 pen 0    L-Methylfolate-Algae (DEPLIN 15) 15-90 314 MG CAPS Take 1 capsule by mouth daily  0    OLANZapine (ZyPREXA) 5 mg tablet Take 1 tablet (5 mg total) by mouth daily at bedtime 5 tablet 0    propranolol (INDERAL) 20 mg tablet Take 1 tablet (20 mg total) by mouth daily 7 tablet 0     No current facility-administered medications for this visit           Allergies:    No Known Allergies    Family History:     Family History   Problem Relation Age of Onset    Hypertension Mother     Breast cancer Mother 54    Other Father         pace maker     Prostate cancer Father 72    Colon cancer Father 68    Anxiety disorder Sister     Depression Sister     No Known Problems Daughter     No Known Problems Daughter     No Known Problems Maternal Grandmother     No Known Problems Maternal Grandfather     No Known Problems Paternal Grandmother     No Known Problems Paternal Grandfather     No Known Problems Maternal Aunt     No Known Problems Maternal Aunt     No Known Problems Paternal Aunt        Social History:     Social History     Socioeconomic History    Marital status: /Civil Union     Spouse name: Not on file    Number of children: Not on file    Years of education: Not on file    Highest education level: Not on file   Occupational History    Occupation: Housewife or Homemaker   Social Needs    Financial resource strain: Not on file    Food insecurity:     Worry: Not on file     Inability: Not on file    Transportation needs:     Medical: Not on file     Non-medical: Not on file   Tobacco Use    Smoking status: Never Smoker    Smokeless tobacco: Never Used    Tobacco comment: No second hand smoke exposure   Substance and Sexual Activity    Alcohol use: No    Drug use: No    Sexual activity: Not on file   Lifestyle    Physical activity:     Days per week: Not on file     Minutes per session: Not on file    Stress: Not on file   Relationships    Social connections:     Talks on phone: Not on file     Gets together: Not on file     Attends Faith service: Not on file     Active member of club or organization: Not on file     Attends meetings of clubs or organizations: Not on file     Relationship status: Not on file    Intimate partner violence:     Fear of current or ex partner: Not on file     Emotionally abused: Not on file     Physically abused: Not on file     Forced sexual activity: Not on file   Other Topics Concern    Not on file   Social History Narrative    Reading         Objective:   Physical Exam:                                                                   Vitals:            /64 (BP Location: Left arm, Patient Position: Sitting, Cuff Size: Large)   Pulse 82   Ht 5' 8" (2 727 m)   Wt 76 7 kg (169 lb)   BMI 25 70 kg/m²   BP Readings from Last 3 Encounters:   12/04/19 110/64   07/26/19 118/74   04/05/19 108/63     Pulse Readings from Last 3 Encounters:   12/04/19 82   07/26/19 92   04/05/19 97                CONSTITUTIONAL: Well developed, well nourished, well groomed  No dysmorphic features  Eyes:  PERRLA, EOM normal      Neck:  Normal ROM, neck supple  HEENT:  Normocephalic atraumatic  No meningismus  Oropharynx is clear and moist  No oral mucosal lesions  Chest:  Respirations regular and unlabored  Cardiovascular:  Distal extremities warm without palpable edema or tenderness, no observed significant swelling  Musculoskeletal:  Full range of motion  (see below under neurologic exam for evaluation of motor function and gait)   Skin:  warm and dry   Psychiatric:  Normal behavior and appropriate affect        SKULL AND SPINE  ROM: Full range of motion  Tenderness: No focal tenderness    MENTAL STATUS  Orientation: Alert and oriented x 3  Fund of knowledge: Intact  CRANIAL NERVES  II: PERRL  Visual fields full  III, IV, VI: Extraocular movements intact  No nystagmus  V: Facial sensation normal V1-V3  VII: Facial movements normal and symmetric  VIII: Intact hearing bilaterally  IX, X: Palate elevation normal and symmetric  XI: Intact trapezius, SCM strength  XII: Tongue protrudes to the midline    MOTOR (Upper and lower extremities)   Bulk/tone/abnormal movement: Normal muscle bulk and tone  Strength: Strength 5/5 throughout  COORDINATION   Station/Gait: Normal baseline gait  Reflexes:  deep tendon reflexes are 2+/4 throughout, flexor response bilaterally       Review of Systems:   Review of Systems  Constitutional: Negative  Negative for appetite change and fever  HENT: Negative  Negative for hearing loss, tinnitus, trouble swallowing and voice change  Eyes: Negative  Negative for photophobia and pain  Respiratory: Negative   Negative for shortness of breath  Cardiovascular: Negative  Negative for palpitations  Gastrointestinal: Negative  Negative for nausea and vomiting  Endocrine: Negative  Negative for cold intolerance and heat intolerance  Genitourinary: Negative  Negative for dysuria, frequency and urgency  Musculoskeletal: Negative  Negative for myalgias and neck pain  Skin: Negative  Negative for rash  Allergic/Immunologic: Negative  Neurological: Positive for headaches (same-nothing has been helping)  Negative for dizziness, tremors, seizures, syncope, facial asymmetry, speech difficulty, weakness, light-headedness and numbness  Hematological: Negative  Does not bruise/bleed easily  Psychiatric/Behavioral: Negative  Negative for confusion, hallucinations and sleep disturbance  I personally reviewed and updated the ROS that was entered by the medical assistant       I have spent 45 minutes with Patient  today in which greater than 50% of this time was spent in counseling/coordination of care regarding Diagnostic results, Prognosis, Risks and benefits of tx options, Intructions for management, Patient and family education, Importance of tx compliance and Risk factor reductions        Author:  Renetta Mckeon PA-C 12/4/2019 11:35 AM

## 2019-12-04 ENCOUNTER — OFFICE VISIT (OUTPATIENT)
Dept: NEUROLOGY | Facility: CLINIC | Age: 54
End: 2019-12-04
Payer: COMMERCIAL

## 2019-12-04 VITALS
WEIGHT: 169 LBS | HEIGHT: 68 IN | DIASTOLIC BLOOD PRESSURE: 64 MMHG | HEART RATE: 82 BPM | BODY MASS INDEX: 25.61 KG/M2 | SYSTOLIC BLOOD PRESSURE: 110 MMHG

## 2019-12-04 DIAGNOSIS — G43.709 CHRONIC MIGRAINE WITHOUT AURA WITHOUT STATUS MIGRAINOSUS, NOT INTRACTABLE: Primary | ICD-10-CM

## 2019-12-04 DIAGNOSIS — G31.9 DIFFUSE BRAIN ATROPHY (HCC): ICD-10-CM

## 2019-12-04 DIAGNOSIS — F32.A ANXIETY AND DEPRESSION: ICD-10-CM

## 2019-12-04 DIAGNOSIS — E55.9 VITAMIN D DEFICIENCY: ICD-10-CM

## 2019-12-04 DIAGNOSIS — F41.9 ANXIETY AND DEPRESSION: ICD-10-CM

## 2019-12-04 PROCEDURE — 99215 OFFICE O/P EST HI 40 MIN: CPT | Performed by: PHYSICIAN ASSISTANT

## 2019-12-04 RX ORDER — CYPROHEPTADINE HYDROCHLORIDE 4 MG/1
4 TABLET ORAL
Qty: 30 TABLET | Refills: 0 | Status: SHIPPED | OUTPATIENT
Start: 2019-12-04 | End: 2020-01-02

## 2019-12-04 RX ORDER — PROPRANOLOL HYDROCHLORIDE 20 MG/1
20 TABLET ORAL DAILY
Qty: 7 TABLET | Refills: 0 | Status: SHIPPED | OUTPATIENT
Start: 2019-12-04 | End: 2021-03-25

## 2019-12-04 RX ORDER — OLANZAPINE 5 MG/1
5 TABLET ORAL
Qty: 5 TABLET | Refills: 0 | Status: SHIPPED | OUTPATIENT
Start: 2019-12-04

## 2019-12-04 RX ORDER — DEXAMETHASONE 1 MG
1 TABLET ORAL
Qty: 5 TABLET | Refills: 0 | Status: SHIPPED | OUTPATIENT
Start: 2019-12-04

## 2019-12-04 NOTE — PROGRESS NOTES
Review of Systems   Constitutional: Negative  Negative for appetite change and fever  HENT: Negative  Negative for hearing loss, tinnitus, trouble swallowing and voice change  Eyes: Negative  Negative for photophobia and pain  Respiratory: Negative  Negative for shortness of breath  Cardiovascular: Negative  Negative for palpitations  Gastrointestinal: Negative  Negative for nausea and vomiting  Endocrine: Negative  Negative for cold intolerance and heat intolerance  Genitourinary: Negative  Negative for dysuria, frequency and urgency  Musculoskeletal: Negative  Negative for myalgias and neck pain  Skin: Negative  Negative for rash  Allergic/Immunologic: Negative  Neurological: Positive for headaches (same-nothing has been helping)  Negative for dizziness, tremors, seizures, syncope, facial asymmetry, speech difficulty, weakness, light-headedness and numbness  Hematological: Negative  Does not bruise/bleed easily  Psychiatric/Behavioral: Negative  Negative for confusion, hallucinations and sleep disturbance

## 2019-12-04 NOTE — ASSESSMENT & PLAN NOTE
Preventative:  Continue Cymbalta 90 mg per psychiatry  Continue hydroxyzine at bedtime  Continue clonidine at bedtime  Continue clonazepam at bedtime  Wean off Propranolol 20 mg  am for 7 days then stop  Use cyproheptadine 4-8 milligrams as needed for weather related headaches  Start Emgality 2 injections first month then 1 injection every 30 days    Abortive: At onset of migraine you may try prochlorperazine  Limit Tylenol and other over-the-counter headache medications to less than 3 doses a week! On the day you are going to start the injections, take Decadron 1mg in am for 5 days then Olanzapine 5 mg at bedtime for 5 nights  Do not use Tylenol, ibuprofen, excedrin etc during these days      Talk to your psychiatrist and/or psychologist about cognitive behavior therapy and biofeedback for migraines

## 2019-12-04 NOTE — ASSESSMENT & PLAN NOTE
Continue to work closely with psychiatry and psychology    Discussed CBT and biofeedback with patient

## 2019-12-04 NOTE — PATIENT INSTRUCTIONS
Preventative:  Continue Cymbalta 90 mg per psychiatry  Continue hydroxyzine at bedtime  Continue clonidine at bedtime  Continue clonazepam at bedtime  Wean off Propranolol 20 mg  am for 7 days then stop  Use cyproheptadine 4-8 milligrams as needed for weather related headaches  Start Emgality 2 injections first month then 1 injection every 30 days    Abortive: At onset of migraine you may try prochlorperazine  Limit Tylenol and other over-the-counter headache medications to less than 3 doses a week! On the day you are going to start the injections, take Decadron 1mg in am for 5 days then Olanzapine 5 mg at bedtime for 5 nights  Do not use Tylenol, ibuprofen, excedrin etc during these days  Talk to your psychiatrist and/or psychologist about cognitive behavior therapy and biofeedback for migraines      May take these over-the-counter supplements to decrease intensity and frequency of migraines  - Magnesium Oxide 400-500 mg a day  If any diarrhea or upset stomach, decrease dose  as tolerated  -  B2 200 mg a day  May take once a day in am  This supplement will change the color of the urine to fluorescent yellow no matter how hydrated, which is normal      Discussed side effects of all medications prescribed today to the patient in detail  Patient education was completed today and we also discussed precautions for rebound headaches  When patient has a moderate to severe headache, they should seek rest, initiate relaxation and apply cold compresses to the head  1  Maintain regular sleep schedule  Adults need at least 7-8 hours of uninterrupted a night  2  Limit over the counter medications such as Tylenol, Ibuprofen, Aleve, Excedrin  (No more than 3 times a week)  3  Maintain headache diary  We discussed an UBALDO for a smart phone is "Migraine buddy"  4  Limit caffeine to 1-2 cups a day or less  5  Avoid dietary trigger  (list given to the patient and reviewed with them)    6  Patient is to have regular frequent meals to prevent headache onset  7   Please drink at least 64 ounces of water a day to help remain hydrated

## 2020-01-01 DIAGNOSIS — G43.709 CHRONIC MIGRAINE WITHOUT AURA WITHOUT STATUS MIGRAINOSUS, NOT INTRACTABLE: ICD-10-CM

## 2020-01-02 RX ORDER — CYPROHEPTADINE HYDROCHLORIDE 4 MG/1
4 TABLET ORAL
Qty: 30 TABLET | Refills: 0 | Status: SHIPPED | OUTPATIENT
Start: 2020-01-02 | End: 2020-03-30 | Stop reason: SDUPTHER

## 2020-01-16 DIAGNOSIS — G43.709 CHRONIC MIGRAINE WITHOUT AURA WITHOUT STATUS MIGRAINOSUS, NOT INTRACTABLE: ICD-10-CM

## 2020-01-16 NOTE — TELEPHONE ENCOUNTER
Pt calls to state that she's had the first 2 emgality injections in her home since 12/5/19 however she did not take them yet because she wanted to wait until after the holidays  She states that she forgot the med had to be refrigerated  She will contact her pharmacy to see if she can get replacements

## 2020-02-07 ENCOUNTER — TELEPHONE (OUTPATIENT)
Dept: NEUROLOGY | Facility: CLINIC | Age: 55
End: 2020-02-07

## 2020-02-07 NOTE — TELEPHONE ENCOUNTER
Patient reports that she had her first Emgality injection about 2 5 weeks ago and she reports she has not had any change in her migraine frequency or severity  Did advise patient that we typically recommend that patients continue the medication for 2-3 months to see if it is effective  She is agreeable to this  She is asking if there is another abortive medication she can try during this time  She states she has been taking cyproheptadine and this does not seem to be working  She states olanzapine did not help in the past, decadron seemed to help a little bit  She is avoiding OTC medications like tylenol and ibuprofen  Please advise  Uses [facility] in [address] to leave detailed message

## 2020-02-10 NOTE — TELEPHONE ENCOUNTER
Ubrelvy 50 mg tab  Take at onset of migraine  May repeat in 2 hours if needed  Limit of 200 mg in 24 hours  Dispense #10 with 3 refills      Please have her let us know if she repeats more than 50% of the time as then we can change to 100 mg tabs with next rx    She needs to download savings card and will be 10 pills for $10 a month

## 2020-02-11 NOTE — TELEPHONE ENCOUNTER
Patient called regarding prior message  Informed her of recommendations  Verbal order called in to [facility] for script of Carole Partida with details of script within this encounter  Informed patient of details regarding medication  Verbalized understanding  No further questions at this time

## 2020-02-12 NOTE — TELEPHONE ENCOUNTER
Call received from pharmacy  [facility] requires PA  [facility]  [place]: Andrew Montes  Awaiting clinical questions  DISPLAY PLAN FREE TEXT

## 2020-02-24 ENCOUNTER — TELEPHONE (OUTPATIENT)
Dept: NEUROLOGY | Facility: CLINIC | Age: 55
End: 2020-02-24

## 2020-02-24 NOTE — TELEPHONE ENCOUNTER
LMOM to r/s 3/3/20 apt with Josue Brooks as she will be out of the office    If patient calls back please assist in R/S

## 2020-03-06 NOTE — TELEPHONE ENCOUNTER
Called patient off waitlist for opening today 3/6/2020 at 1030 am  Patient declined due to being out of town  Will keep eye on schedule

## 2020-03-12 NOTE — TELEPHONE ENCOUNTER
Called patient off wait list to offer available appt today 3/12/2020 at 230 pm  Patient declined due to other appts  I will keep eye on schedule for sooner appt

## 2020-03-26 RX ORDER — UBROGEPANT 50 MG/1
TABLET ORAL
COMMUNITY
Start: 2020-02-14 | End: 2020-09-21

## 2020-03-26 RX ORDER — HYDROXYZINE HYDROCHLORIDE 25 MG/1
50 TABLET, FILM COATED ORAL
COMMUNITY
Start: 2020-02-11

## 2020-03-30 ENCOUNTER — TELEMEDICINE (OUTPATIENT)
Dept: NEUROLOGY | Facility: CLINIC | Age: 55
End: 2020-03-30
Payer: COMMERCIAL

## 2020-03-30 ENCOUNTER — TELEPHONE (OUTPATIENT)
Dept: NEUROLOGY | Facility: CLINIC | Age: 55
End: 2020-03-30

## 2020-03-30 DIAGNOSIS — G43.709 CHRONIC MIGRAINE WITHOUT AURA WITHOUT STATUS MIGRAINOSUS, NOT INTRACTABLE: ICD-10-CM

## 2020-03-30 PROCEDURE — 99213 OFFICE O/P EST LOW 20 MIN: CPT | Performed by: PHYSICIAN ASSISTANT

## 2020-03-30 RX ORDER — CYPROHEPTADINE HYDROCHLORIDE 4 MG/1
4 TABLET ORAL
Qty: 30 TABLET | Refills: 3 | Status: SHIPPED | OUTPATIENT
Start: 2020-03-30 | End: 2020-06-24

## 2020-03-30 NOTE — TELEPHONE ENCOUNTER
Spoke with patient and scheduled 3 month follow up on 7/8/2020 at 1030 am with Selam Schwartz  Mailing AVS from today's telemedicine visit to patient

## 2020-03-30 NOTE — PATIENT INSTRUCTIONS
Preventative:  Continue Cymbalta 90 mg per psychiatry  Continue hydroxyzine at bedtime  Continue clonidine at bedtime  Continue clonazepam at bedtime  Use cyproheptadine 4-8 milligrams as needed for weather related headaches  Emgality 1 injection every 30 days    Abortive: At onset of migraine or Aura take Ubrelvy 50 mg  May repeat in 2 hours if needed  Limit of 200 mg in 24 hours  If you are needing to repeat the medication over 50% of the time or are not satisfied with the results, call for 100 mg tablets  If you have nausea or no improvement in your migraines,  you may try prochlorperazine  Limit Tylenol and other over-the-counter headache medications to less than 3 doses a week! Talk to your psychiatrist and/or psychologist about cognitive behavior therapy and biofeedback for migraines      May take these over-the-counter supplements to decrease intensity and frequency of migraines  - Magnesium Oxide 400-500 mg a day  If any diarrhea or upset stomach, decrease dose  as tolerated  -  B2 200 mg a day  May take once a day in am  This supplement will change the color of the urine to fluorescent yellow no matter how hydrated, which is normal      Discussed side effects of all medications prescribed today to the patient in detail  Patient education was completed today and we also discussed precautions for rebound headaches  When patient has a moderate to severe headache, they should seek rest, initiate relaxation and apply cold compresses to the head  1  Maintain regular sleep schedule  Adults need at least 7-8 hours of uninterrupted a night  2  Limit over the counter medications such as Tylenol, Ibuprofen, Aleve, Excedrin  (No more than 3 times a week)  3  Maintain headache diary  We discussed an UBALDO for a smart phone is "Migraine buddy"  4  Limit caffeine to 1-2 cups a day or less  5  Avoid dietary trigger  (list given to the patient and reviewed with them)    6  Patient is to have regular frequent meals to prevent headache onset  7   Please drink at least 64 ounces of water a day to help remain hydrated

## 2020-03-30 NOTE — TELEPHONE ENCOUNTER
----- Message from Berny Garcia PA-C sent at 3/30/2020 10:32 AM EDT -----  Regarding: f/u  3 m f/u please

## 2020-03-30 NOTE — PROGRESS NOTES
Virtual Regular Visit    Problem List Items Addressed This Visit        Cardiovascular and Mediastinum    Chronic migraine without aura     Preventative:  Continue Cymbalta 90 mg per psychiatry  Continue hydroxyzine at bedtime  Continue clonidine at bedtime  Continue clonazepam at bedtime  Use cyproheptadine 4-8 milligrams as needed for weather related headaches  Emgality 1 injection every 30 days    Abortive: At onset of migraine or Aura take Ubrelvy 50 mg  May repeat in 2 hours if needed  Limit of 200 mg in 24 hours  If you are needing to repeat the medication over 50% of the time or are not satisfied with the results, call for 100 mg tablets  If you have nausea or no improvement in your migraines,  you may try prochlorperazine  Limit Tylenol and other over-the-counter headache medications to less than 3 doses a week! Relevant Medications    UBRELVY 50 MG TABS    cyproheptadine (PERIACTIN) 4 mg tablet               Reason for visit is migraine    Encounter provider Gildardo Khan PA-C    Provider located at [address] [place] [phone]      Recent Visits  No visits were found meeting these conditions  Showing recent visits within past 7 days and meeting all other requirements     Today's Visits  Date Type Provider Dept   03/30/20 Telemedicine Gildardo Khan PA-C [facility] [address] [place] [address] today's visits and meeting all other requirements     Future Appointments  Date Type Provider Dept   03/30/20 Telemedicine Gildardo Khan PA-C [facility]   Showing future appointments within next 150 days and meeting all other requirements        The patient was identified by name and date of birth  Isidro Mccall was informed that this is a telemedicine visit and that the visit is being conducted through [employer]  My office door was closed  No one else was in the room    She acknowledged consent and understanding of privacy and security of the video platform  The patient has agreed to participate and understands they can discontinue the visit at any time  Patient is aware this is a billable service  Subjective  Jus Schrader is a 63 y  o right handed female  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 age 11 and 16  Her 6year old has autism  She has history of uterine cancer and is s/p full hysterectomy in 2011  She had no chemo or radiation       QTc 3/21/2018 415 ms     Patient has been dealing with stress lately          Memory problem:   She states she has stress in her life due to her daughter having autism  She has noted she has lost her car in the parking lot  She walks into a place and is not sure why she came in for  She has gotten to places and is not sure how she got there  For the last 2-3 years she is having more memory loss  Continues to be a problem as the stress increases in her life  Estuardo Fonseca she feels this is stable      Chronic migraine headaches:   What medications do you take or have you taken for your headaches? PREVENTATIVE:  Magnesium,   cyproheptadine,   Depakote (tremor),   Cymbalta,   propranolol  Would not use Topamax due to memory problems  Would not use amitriptyline or other psychiatric medications as sees psychiatry  Emgality  ABORTIVE:  Excedrin, Tylenol,   Prochlorperazine  Ubrelvy     Alternative therapies used in the past for headaches? massage  Headache are worse if the patient:  no  Headache trigger: Fatigue, Stress/Tension, Weather change,      Aura: none     What is your current pain level - 2/10     Any family history of migraines? No  Any family history of aneurysms? No     How often do the headaches occur - 3-4 a week  What time of the day do the headaches start - varies but usually in afternoon  How long do the headaches last - 2-6 hours (until goes to sleep)  Where are they located - occipital and usually temporal region   Can be orbital  What is the intensity of pain - 3 to 7/10 -  Average 7/10;  Describe your usual headache - Throbbing, Pounding, pressure  Associated symptoms:          Decrease of appetite, some nausea, diarrhea          Photophobia, phonophobia, sensitivity to smell          Problem with concentration         runny or stuffed-up nose         light-headed or dizzy, stiff or sore neck,          prefer to be alone and in a dark room, unable to work     Number of days missed per month because of headaches:  Work (or school) days: NA  Social or Family activities: 1      What time of the year do headaches occur more frequently?   none  Have you seen someone else for headaches or pain? Yes, PCP  Have you had trigger point injection performed and how often? No  Have you had Botox injection performed and how often? No   Have you had epidural injections or transforaminal injections performed? No     Have you used CBD or THC for your headaches and how often? No  Are you current pregnant or planning on getting pregnant? No, hysterectomy 2011 for uterine cancer  Have you ever had any Brain imaging? yes      8/22/2017MRI NQ  Volume loss, much greater than would be expected for age, prominence of all CSF-containing spaces   No acute ischemic comment cranial mass, mass effect or edema   No hemosiderin deposition   Xanthogranuloma choroid plexus stable        QUANTITATIVE:      Exam Quality: Adequate for volumetric analysis      Hippocampus     Total hippocampal volume (cc):   8 4    Percentile for similar age:      31th      Lateral ventricular volume (cc):     34 45    Percentile for similar age:     Greater than 95th      Inferior lateral vent volume (cc):    1 62    Percentile for similar age:     27th         Quantitative conclusions:        Hippocampi:                          Slightly diminished        Lateral Ventricle Volume:     Markedly prominent       Temporal Horn Volume:       Relatively diminished     Concordance between qualitative and quantitative hippocampal volume assessment: Concordant       Change in brain volumes: No previous volumetric study for comparison     Mean hippocampal volume loss among normal elderly: 0 7% per year, (-0 3 to 1 7;  Bishop 2008; also Wilfredo 2010)     VENTRICLES:  Prominent for age, reflecting diffuse generalized volume loss     SELLA AND PITUITARY GLAND:  Normal      ORBITS:  Normal      PARANASAL SINUSES:  Normal      VASCULATURE:  Evaluation of the major intracranial vasculature demonstrates appropriate flow voids      CALVARIUM AND SKULL BASE:  Normal      EXTRACRANIAL SOFT TISSUES:  Normal      8/13/2019 MRI NQ brain  No acute intracranial abnormality      NeuroQuant analysis was performed: Normal hippocampal volume and enlarged superior lateral ventricular system: does not support hippocampal degeneration   Possible expansion of ventricular system without medial temporal lobe focused ex-vacuo process       I personally reviewed these images      - Reviewed old notes from physician seen in the past- see above HPI for summary of previous encounters          Past Medical History:   Diagnosis Date    Endometrial cancer ([facility] [address] ) 2011    Last assessed: 2/8/17; age 51    Sinusitis     Streptococcal pharyngitis        Past Surgical History:   Procedure Laterality Date    CATARACT EXTRACTION, BILATERAL      DIAGNOSTIC LAPAROSCOPY      HYSTERECTOMY      LAPAROSCOPIC TOTAL HYSTERECTOMY      MOLE REMOVAL  07/12/2019    TOTAL ABDOMINAL HYSTERECTOMY W/ BILATERAL SALPINGOOPHORECTOMY Bilateral 2011    Last assessed: 2/8/17; age 51       Current Outpatient Medications   Medication Sig Dispense Refill    acetaminophen (TYLENOL) 500 mg tablet Take 1,000 mg by mouth as needed for mild pain (5-6 times a week)      ALPHAGAN P 0 1 % INSERT 1 DROPS OPHTHALMICALLY EVERY 12 HOURS INTO BOTH EYES  1    ALPRAZolam (XANAX) 0 25 mg tablet TAKE 1 TABLET (0 25 MG TOTAL) BY MOUTH 2 (TWO) TIMES A DAY AS NEEDED FOR ANXIETY 30 tablet 0  clonazePAM (KlonoPIN) 0 5 mg tablet       cloNIDine (CATAPRES) 0 1 mg tablet 1 &1/2 TABS AT BEDTIME  0    cloNIDine (CATAPRES) 0 2 mg tablet TAKE 1/2 TABLET AT 5PM AND 1 TABLET AT BEDTIME  0    cyproheptadine (PERIACTIN) 4 mg tablet Take 1 tablet (4 mg total) by mouth daily at bedtime as needed for allergies (headaches) 30 tablet 3    dexamethasone (DECADRON) 1 mg tablet Take 1 tablet (1 mg total) by mouth daily with breakfast 5 tablet 0    DULoxetine (CYMBALTA) 30 mg delayed release capsule Take 90 mg by mouth daily  0    estradiol (ESTRACE) 0 1 mg/g vaginal cream 1 GM VAGINALLY TWICE WEEKLY AT BEDTIME  3    eszopiclone (LUNESTA) 3 MG tablet Take 3 mg by mouth daily  0    Galcanezumab-gnlm 120 MG/ML SOAJ Inject one pen (120 mg each) into each leg for the initial loading dose   2 pen 0    Galcanezumab-gnlm 120 MG/ML SOAJ Inject 120 mg under the skin every 30 (thirty) days Starting second month 1 pen 11    hydrOXYzine HCL (ATARAX) 25 mg tablet Take 100 mg by mouth daily at bedtime      hydrOXYzine HCL (ATARAX) 50 mg tablet TAKE 2 TABS BYMOUTH AT BEDTIME  0    ibuprofen (MOTRIN) 200 mg tablet Take 400 mg by mouth as needed for mild pain      L-Methylfolate-Algae (DEPLIN 15) 15-90 314 MG CAPS Take 1 capsule by mouth daily  0    lidocaine (XYLOCAINE) 5 % ointment APPLY TO THE AFFECTED AREA ONCE EVERY NIGHT      Multiple Vitamins-Minerals (EYE SUPPORT PO) Take by mouth daily      OLANZapine (ZyPREXA) 5 mg tablet Take 1 tablet (5 mg total) by mouth daily at bedtime 5 tablet 0    prochlorperazine (COMPAZINE) 10 mg tablet Take 1 tablet (10 mg total) by mouth every 8 (eight) hours as needed (migraine) 10 tablet 0    propranolol (INDERAL LA) 60 mg 24 hr capsule TAKE 1 CAPSULE (60 MG TOTAL) BY MOUTH DAILY 1/2 TABLET IN AM 90 capsule 0    propranolol (INDERAL) 20 mg tablet Take 1 tablet (20 mg total) by mouth daily 7 tablet 0    triamcinolone (KENALOG) 0 1 % cream APPLY TO SKIN TWICE DAILY X 1-2 WEEKS THEN AS NEEDED FOR RASH  1    UBRELVY 50 MG TABS TAKE AT ONSET OF MIGRAINE, MAY REPEAT IN 2 HOURS IF NEEDED      valACYclovir (VALTREX) 500 mg tablet Take 1 tablet by mouth daily       No current facility-administered medications for this visit  No Known Allergies    Review of Systems   Constitutional: Negative  HENT: Positive for sinus pressure and sneezing  Eyes: Negative  Respiratory: Negative  Cardiovascular: Negative  Gastrointestinal: Negative  Endocrine: Negative  Genitourinary: Negative  Musculoskeletal: Negative  Skin: Negative  Allergic/Immunologic: Negative  Neurological: Positive for headaches  Hematological: Negative  Psychiatric/Behavioral: Positive for decreased concentration  The patient is nervous/anxious  Physical Exam   CONSTITUTIONAL: Well developed, well nourished, well groomed  No dysmorphic features  Eyes:  EOM normal      Neck:  Normal ROM, neck supple  HEENT:  Normocephalic atraumatic  Chest:  Respirations regular and unlabored  Psychiatric:  Normal behavior and appropriate affect      MENTAL STATUS  Orientation: Alert and oriented x 3  Fund of knowledge: Intact  MOTOR (Upper and lower extremities)   Bulk/tone/abnormal movement: Normal muscle bulk and tone  I spent 26 minutes with the patient during this visit

## 2020-03-30 NOTE — ASSESSMENT & PLAN NOTE
Preventative:  Continue Cymbalta 90 mg per psychiatry  Continue hydroxyzine at bedtime  Continue clonidine at bedtime  Continue clonazepam at bedtime  Use cyproheptadine 4-8 milligrams as needed for weather related headaches  Emgality 1 injection every 30 days    Abortive: At onset of migraine or Aura take Ubrelvy 50 mg  May repeat in 2 hours if needed  Limit of 200 mg in 24 hours  If you are needing to repeat the medication over 50% of the time or are not satisfied with the results, call for 100 mg tablets  If you have nausea or no improvement in your migraines,  you may try prochlorperazine  Limit Tylenol and other over-the-counter headache medications to less than 3 doses a week!

## 2020-06-24 DIAGNOSIS — G43.709 CHRONIC MIGRAINE WITHOUT AURA WITHOUT STATUS MIGRAINOSUS, NOT INTRACTABLE: ICD-10-CM

## 2020-06-24 RX ORDER — CYPROHEPTADINE HYDROCHLORIDE 4 MG/1
4 TABLET ORAL
Qty: 90 TABLET | Refills: 1 | Status: SHIPPED | OUTPATIENT
Start: 2020-06-24 | End: 2020-12-21

## 2020-09-19 DIAGNOSIS — G43.709 CHRONIC MIGRAINE WITHOUT AURA WITHOUT STATUS MIGRAINOSUS, NOT INTRACTABLE: Primary | ICD-10-CM

## 2020-09-21 RX ORDER — UBROGEPANT 50 MG/1
TABLET ORAL
Qty: 10 TABLET | Refills: 3 | Status: SHIPPED | OUTPATIENT
Start: 2020-09-21 | End: 2020-09-29 | Stop reason: SDUPTHER

## 2020-09-24 RX ORDER — ALPRAZOLAM 0.5 MG/1
0.5 TABLET ORAL DAILY PRN
COMMUNITY
Start: 2020-08-04

## 2020-09-29 ENCOUNTER — OFFICE VISIT (OUTPATIENT)
Dept: NEUROLOGY | Facility: CLINIC | Age: 55
End: 2020-09-29
Payer: COMMERCIAL

## 2020-09-29 VITALS
TEMPERATURE: 97 F | WEIGHT: 176 LBS | HEART RATE: 99 BPM | SYSTOLIC BLOOD PRESSURE: 110 MMHG | BODY MASS INDEX: 26.67 KG/M2 | DIASTOLIC BLOOD PRESSURE: 70 MMHG | HEIGHT: 68 IN

## 2020-09-29 DIAGNOSIS — G43.709 CHRONIC MIGRAINE WITHOUT AURA WITHOUT STATUS MIGRAINOSUS, NOT INTRACTABLE: ICD-10-CM

## 2020-09-29 PROCEDURE — 99214 OFFICE O/P EST MOD 30 MIN: CPT | Performed by: PHYSICIAN ASSISTANT

## 2020-09-29 NOTE — ASSESSMENT & PLAN NOTE
Preventative:  Continue Cymbalta 90 mg per psychiatry  Continue hydroxyzine at bedtime  Continue clonidine at bedtime  Continue clonazepam at bedtime  Use cyproheptadine 4-8 milligrams as needed for weather related headaches  Emgality 1 injection every 30 days    Abortive: At onset of migraine or Aura take Ubrelvy 100 mg  May repeat in 2 hours if needed  Limit of 200 mg in 24 hours  If you are needing to repeat the medication over 50% of the time or are not satisfied with the results, call for 100 mg tablets  If you have nausea or no improvement in your migraines,  you may try prochlorperazine  Limit Tylenol and other over-the-counter headache medications to less than 3 doses a week!

## 2020-09-29 NOTE — PROGRESS NOTES
Review of Systems   Constitutional: Negative  HENT: Negative  Eyes: Negative  Respiratory: Negative  Cardiovascular: Negative  Gastrointestinal: Negative  Endocrine: Negative  Genitourinary: Negative  Musculoskeletal: Positive for back pain (lower back )  Skin: Negative  Allergic/Immunologic: Negative  Neurological: Positive for headaches (average 3 migraines a month/ average 12 headaches a month )  Hematological: Negative  Psychiatric/Behavioral: Positive for decreased concentration  The patient is nervous/anxious

## 2020-09-29 NOTE — PROGRESS NOTES
[facility]  PATIENT:  Tarsha Salgado  MRN:  [id number]  :  1965  DATE OF SERVICE:  2020      Assessment/Plan:     Chronic migraine without aura  Preventative:  Continue Cymbalta 90 mg per psychiatry  Continue hydroxyzine at bedtime  Continue clonidine at bedtime  Continue clonazepam at bedtime  Use cyproheptadine 4-8 milligrams as needed for weather related headaches  Emgality 1 injection every 30 days    Abortive: At onset of migraine or Aura take Ubrelvy 100 mg  May repeat in 2 hours if needed  Limit of 200 mg in 24 hours  If you are needing to repeat the medication over 50% of the time or are not satisfied with the results, call for 100 mg tablets  If you have nausea or no improvement in your migraines,  you may try prochlorperazine  Limit Tylenol and other over-the-counter headache medications to less than 3 doses a week! Problem List Items Addressed This Visit        Cardiovascular and Mediastinum    Chronic migraine without aura     Preventative:  Continue Cymbalta 90 mg per psychiatry  Continue hydroxyzine at bedtime  Continue clonidine at bedtime  Continue clonazepam at bedtime  Use cyproheptadine 4-8 milligrams as needed for weather related headaches  Emgality 1 injection every 30 days    Abortive: At onset of migraine or Aura take Ubrelvy 100 mg  May repeat in 2 hours if needed  Limit of 200 mg in 24 hours  If you are needing to repeat the medication over 50% of the time or are not satisfied with the results, call for 100 mg tablets  If you have nausea or no improvement in your migraines,  you may try prochlorperazine  Limit Tylenol and other over-the-counter headache medications to less than 3 doses a week! 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 age 12 and 25  Her 15year old has autism  She has history of uterine cancer and is s/p full hysterectomy in 2011  She had no chemo or radiation       QTc 3/21/2018 415 ms     Patient has been dealing with stress lately          Memory problem:   She states she has stress in her life due to her daughter having autism  She has noted she has lost her car in the parking lot  She walks into a place and is not sure why she came in for  She has gotten to places and is not sure how she got there  For the last 2-3 years she is having more memory loss  Continues to be a problem as the stress increases in her life  Nichole Gregory she feels this is stable      Chronic migraine headaches:   What medications do you take or have you taken for your headaches? PREVENTATIVE:  Magnesium,   cyproheptadine,   Depakote (tremor),   Cymbalta,   propranolol  Would not use Topamax due to memory problems  Would not use amitriptyline or other psychiatric medications as sees psychiatry  Emgality (Jan 2020)  ABORTIVE:  Excedrin, Tylenol,   Prochlorperazine  Ubrelvy     Alternative therapies used in the past for headaches? massage  Headache are worse if the patient:  no  Headache trigger: Fatigue, Stress/Tension, Weather change,      Aura: none     What is your current pain level - 1/10     Any family history of migraines? No  Any family history of aneurysms? No     How often do the headaches occur - migraines 3 a month; mild 3 a week  What time of the day do the headaches start - varies (starting in the am now)  How long do the headaches last - 2-4 hours after ubrelvy (until goes to sleep)  Where are they located - occipital and usually temporal region   Can be orbital  What is the intensity of pain - 3 to 7/10 -  migraines Average 6/10; mild 2-3/10  Describe your usual headache - Throbbing, Pounding, pressure  Associated symptoms:          Decrease of appetite, some nausea, diarrhea        Photophobia, phonophobia, sensitivity to smell          Problem with concentration         runny or stuffed-up nose         light-headed or dizzy, stiff or sore neck,          prefer to be alone and in a dark room, unable to work     Number of days missed per month because of headaches:  Work (or school) days: NA  Social or Family activities: 1      What time of the year do headaches occur more frequently?   none  Have you seen someone else for headaches or pain? Yes, PCP  Have you had trigger point injection performed and how often? No  Have you had Botox injection performed and how often? No   Have you had epidural injections or transforaminal injections performed? No     Have you used CBD or THC for your headaches and how often? No  Are you current pregnant or planning on getting pregnant? No, hysterectomy 2011 for uterine cancer  Have you ever had any Brain imaging? yes      8/22/2017 MRI NQ  Volume loss, much greater than would be expected for age, prominence of all CSF-containing spaces   No acute ischemic comment cranial mass, mass effect or edema   No hemosiderin deposition   Xanthogranuloma choroid plexus stable        QUANTITATIVE:      Exam Quality: Adequate for volumetric analysis      Hippocampus     Total hippocampal volume (cc):   8 4    Percentile for similar age:      31th      Lateral ventricular volume (cc):     80 22    Percentile for similar age:     Greater than 95th      Inferior lateral vent volume (cc):    1 62    Percentile for similar age:     27th         Quantitative conclusions:        Hippocampi:                          Slightly diminished        Lateral Ventricle Volume:     Markedly prominent       Temporal Horn Volume:       Relatively diminished     Concordance between qualitative and quantitative hippocampal volume assessment: Concordant       Change in brain volumes: No previous volumetric study for comparison     Mean hippocampal volume loss among normal elderly: 0 7% per year, (-0 3 to 1 7;  Bishop 2008; also Wilfredo 2010)        VENTRICLES:  Prominent for age, reflecting diffuse generalized volume loss     SELLA AND PITUITARY GLAND:  Normal      ORBITS:  Normal      PARANASAL SINUSES:  Normal      VASCULATURE:  Evaluation of the major intracranial vasculature demonstrates appropriate flow voids      CALVARIUM AND SKULL BASE:  Normal      EXTRACRANIAL SOFT TISSUES:  Normal      8/13/2019 MRI NQ brain  No acute intracranial abnormality      NeuroQuant analysis was performed: Normal hippocampal volume and enlarged superior lateral ventricular system: does not support hippocampal degeneration   Possible expansion of ventricular system without medial temporal lobe focused ex-vacuo process       I personally reviewed these images      - Reviewed old notes from physician seen in the past- see above HPI for summary of previous encounters         Past Medical History:   Diagnosis Date    Endometrial cancer ([facility] [address] ) 2011    Last assessed: 2/8/17; age 51    Sinusitis     Streptococcal pharyngitis        Patient Active Problem List   Diagnosis    Allergic rhinitis    Anxiety and depression    Cataract, bilateral    Chronic migraine without aura    Chronic pelvic pain in female    Endometrial cancer ([facility] [address] )    Gastroesophageal reflux disease with esophagitis    Glaucoma    Groin mass    IBS (irritable bowel syndrome)    Insomnia    Iron deficiency anemia    Vitamin D deficiency    Diarrhea    Left lower quadrant pain    Tremor    Early menopause    Oral herpes    Thrombocytopenia (HCC)    Abnormal CBC    Diffuse brain atrophy (HCC)    Diverticulosis of large intestine    Carpal tunnel syndrome    Closed fracture of metatarsal bone    Displacement of cervical intervertebral disc without myelopathy    Cervical stenosis of spinal canal    Neurodegenerative cognitive impairment (HCC)       Medications:      Current Outpatient Medications   Medication Sig Dispense Refill    acetaminophen (TYLENOL) 500 mg tablet Take 1,000 mg by mouth as needed for mild pain (3-4 times a week)       ALPRAZolam (XANAX) 0 5 mg tablet Take 0 5 mg by mouth daily as needed      cloNIDine (CATAPRES) 0 1 mg tablet Take 0 2 mg by mouth daily at bedtime   0    cyproheptadine (PERIACTIN) 4 mg tablet TAKE 1 TABLET (4 MG TOTAL) BY MOUTH DAILY AT BEDTIME AS NEEDED FOR ALLERGIES (HEADACHES) 90 tablet 1    dexamethasone (DECADRON) 1 mg tablet Take 1 tablet (1 mg total) by mouth daily with breakfast (Patient taking differently: Take 1 mg by mouth as needed ) 5 tablet 0    DULoxetine (CYMBALTA) 30 mg delayed release capsule Take 90 mg by mouth daily  0    estradiol (ESTRACE) 0 1 mg/g vaginal cream 1 GM VAGINALLY TWICE WEEKLY AT BEDTIME  3    eszopiclone (LUNESTA) 3 MG tablet Take 3 mg by mouth daily  0    Galcanezumab-gnlm 120 MG/ML SOAJ Inject 120 mg under the skin every 30 (thirty) days Starting second month 1 pen 11    hydrOXYzine HCL (ATARAX) 25 mg tablet Take 100 mg by mouth daily at bedtime      ibuprofen (MOTRIN) 200 mg tablet Take 400 mg by mouth as needed for mild pain      lidocaine (XYLOCAINE) 5 % ointment APPLY TO THE AFFECTED AREA ONCE EVERY NIGHT      Multiple Vitamins-Minerals (EYE SUPPORT PO) Take by mouth daily      OnabotulinumtoxinA (BOTOX IJ) Inject as directed      prochlorperazine (COMPAZINE) 10 mg tablet Take 1 tablet (10 mg total) by mouth every 8 (eight) hours as needed (migraine) 10 tablet 0    Ubrogepant (UBRELVY) 100 MG tablet Take 1 tablet (100 mg total) by mouth as needed (migraine) Take 1 tablet as needed for migraine  May repeat one additional tablet, at least two hours after the first dose  Do not use more than two doses per day   10 tablet 3    valACYclovir (VALTREX) 500 mg tablet Take 1 tablet by mouth daily      ALPHAGAN P 0 1 % INSERT 1 DROPS OPHTHALMICALLY EVERY 12 HOURS INTO BOTH EYES  1    ALPRAZolam (XANAX) 0 25 mg tablet TAKE 1 TABLET (0 25 MG TOTAL) BY MOUTH 2 (TWO) TIMES A DAY AS NEEDED FOR ANXIETY (Patient not taking: Reported on 9/29/2020) 30 tablet 0    clonazePAM (KlonoPIN) 0 5 mg tablet Take 0 5 mg by mouth daily       cloNIDine (CATAPRES) 0 2 mg tablet TAKE 1/2 TABLET AT 5PM AND 1 TABLET AT BEDTIME  0    Galcanezumab-gnlm 120 MG/ML SOAJ Inject one pen (120 mg each) into each leg for the initial loading dose  (Patient not taking: Reported on 9/29/2020) 2 pen 0    hydrOXYzine HCL (ATARAX) 50 mg tablet TAKE 2 TABS BYMOUTH AT BEDTIME  0    L-Methylfolate-Algae (DEPLIN 15) 15-90 314 MG CAPS Take 1 capsule by mouth daily  0    OLANZapine (ZyPREXA) 5 mg tablet Take 1 tablet (5 mg total) by mouth daily at bedtime (Patient not taking: Reported on 9/29/2020) 5 tablet 0    propranolol (INDERAL LA) 60 mg 24 hr capsule TAKE 1 CAPSULE (60 MG TOTAL) BY MOUTH DAILY 1/2 TABLET IN AM (Patient not taking: Reported on 9/29/2020) 90 capsule 0    propranolol (INDERAL) 20 mg tablet Take 1 tablet (20 mg total) by mouth daily (Patient not taking: Reported on 9/29/2020) 7 tablet 0    triamcinolone (KENALOG) 0 1 % cream APPLY TO SKIN TWICE DAILY X 1-2 WEEKS THEN AS NEEDED FOR RASH  1     No current facility-administered medications for this visit           Allergies:    No Known Allergies    Family History:     Family History   Problem Relation Age of Onset    Hypertension Mother     Breast cancer Mother 52    Other Father         pace maker     Prostate cancer Father 72    Colon cancer Father 68    Anxiety disorder Sister     Depression Sister     No Known Problems Daughter     No Known Problems Daughter     No Known Problems Maternal Grandmother     No Known Problems Maternal Grandfather     No Known Problems Paternal Grandmother     No Known Problems Paternal Grandfather     No Known Problems Maternal Aunt     No Known Problems Maternal Aunt     No Known Problems Paternal Aunt        Social History:     Social History     Socioeconomic History    Marital status: /Civil Union     Spouse name: Not on file    Number of children: Not on file    Years of education: Not on file    Highest education level: Not on file   Occupational History    Occupation: Housewife or [address] resource strain: Not on file    Food insecurity     Worry: Not on file     Inability: Not on file   [employer] needs     Medical: Not on file     Non-medical: Not on file   Tobacco Use    Smoking status: Never Smoker    Smokeless tobacco: Never Used    Tobacco comment: No second hand smoke exposure   Substance and Sexual Activity    Alcohol use: No    Drug use: No    Sexual activity: Not on file   Lifestyle    Physical activity     Days per week: Not on file     Minutes per session: Not on file    Stress: Not on file   Relationships    Social connections     Talks on phone: Not on file     Gets together: Not on file     Attends [employer] service: Not on file     Active member of club or organization: Not on file     Attends meetings of clubs or organizations: Not on file     Relationship status: Not on file    Intimate partner violence     Fear of current or ex partner: Not on file     Emotionally abused: Not on file     Physically abused: Not on file     Forced sexual activity: Not on file   Other Topics Concern    Not on file   Social History Narrative    Reading      I have reviewed the patient's medical, social and surgical history as well as medications in detail and updated the computerized patient record        Objective:   Physical Exam:                                                                   Vitals:            /70 (BP Location: Left arm, Patient Position: Sitting, Cuff Size: Adult)   Pulse 99   Temp (!) 97 °F (36 1 °C) (Tympanic)   Ht 5' 8" (1 727 m)   Wt 79 8 kg (176 lb)   BMI 26 76 kg/m²   BP Readings from Last 3 Encounters:   09/29/20 110/70   12/04/19 110/64   07/26/19 118/74     Pulse Readings from Last 3 Encounters:   09/29/20 99   12/04/19 82   07/26/19 92          CONSTITUTIONAL: Well developed, well nourished, well groomed  No dysmorphic features  Eyes:  EOM normal      Neck:  Normal ROM, neck supple  HEENT:  Normocephalic atraumatic  Chest:  Respirations regular and unlabored  Psychiatric:  Normal behavior and appropriate affect      MENTAL STATUS  Orientation: Alert and oriented x 3  Fund of knowledge: Intact  MOTOR (Upper and lower extremities)   Bulk/tone/abnormal movement: Normal muscle bulk and tone  COORDINATION   Station/Gait: Normal baseline gait  Review of Systems:   Review of Systems  Constitutional: Negative  HENT: Negative  Eyes: Negative  Respiratory: Negative  Cardiovascular: Negative  Gastrointestinal: Negative  Endocrine: Negative  Genitourinary: Negative  Musculoskeletal: Positive for back pain (lower back )  Skin: Negative  Allergic/Immunologic: Negative  Neurological: Positive for headaches (average 3 migraines a month/ average 12 headaches a month )  Hematological: Negative  Psychiatric/Behavioral: Positive for decreased concentration  The patient is nervous/anxious      I personally reviewed the ROS entered by the MA      Greater than 50% of the 25 minutes evaluation was a face-to-face discussion regarding  the pathophysiology of her current symptoms and further plan, as well as counseling, educating, and coordinating the patient's care including risks and benefits of treatment, instructions for disease self management, treatment instructions and follow up requirements  Author:  Nora Quispe PA-C 9/29/2020 10:46 AM

## 2020-09-29 NOTE — PATIENT INSTRUCTIONS
Preventative:  Continue Cymbalta 90 mg per psychiatry  Continue hydroxyzine at bedtime  Continue clonidine at bedtime  Continue clonazepam at bedtime  Use cyproheptadine 4-8 milligrams as needed for weather related headaches  Emgality 1 injection every 30 days    Abortive: At onset of migraine or Aura take Ubrelvy 100 mg  May repeat in 2 hours if needed  Limit of 200 mg in 24 hours  If you are needing to repeat the medication over 50% of the time or are not satisfied with the results, call for 100 mg tablets  If you have nausea or no improvement in your migraines,  you may try prochlorperazine  Limit Tylenol and other over-the-counter headache medications to less than 3 doses a week! May take these over-the-counter supplements to decrease intensity and frequency of migraines  - Magnesium Oxide 400-500 mg a day  If any diarrhea or upset stomach, decrease dose  as tolerated  -  B2 200 mg a day  May take once a day in am  This supplement will change the color of the urine to fluorescent yellow no matter how hydrated, which is normal      Discussed side effects of all medications prescribed today to the patient in detail  Patient education was completed today and we also discussed precautions for rebound headaches  When patient has a moderate to severe headache, they should seek rest, initiate relaxation and apply cold compresses to the head  1  Maintain regular sleep schedule  Adults need at least 7-8 hours of uninterrupted a night  2  Limit over the counter medications such as Tylenol, Ibuprofen, Aleve, Excedrin  (No more than 3 times a week)  3  Maintain headache diary  We discussed an UBALDO for a smart phone is "Migraine buddy"  4  Limit caffeine to 1-2 cups a day or less  5  Avoid dietary trigger  (list given to the patient and reviewed with them)  6  Patient is to have regular frequent meals to prevent headache onset      7   Please drink at least 64 ounces of water a day to help remain hydrated

## 2020-12-21 DIAGNOSIS — G43.709 CHRONIC MIGRAINE WITHOUT AURA WITHOUT STATUS MIGRAINOSUS, NOT INTRACTABLE: ICD-10-CM

## 2020-12-21 RX ORDER — CYPROHEPTADINE HYDROCHLORIDE 4 MG/1
4 TABLET ORAL
Qty: 90 TABLET | Refills: 1 | Status: SHIPPED | OUTPATIENT
Start: 2020-12-21

## 2020-12-30 ENCOUNTER — TELEPHONE (OUTPATIENT)
Dept: NEUROLOGY | Facility: CLINIC | Age: 55
End: 2020-12-30

## 2021-01-19 ENCOUNTER — HOSPITAL ENCOUNTER (OUTPATIENT)
Dept: MAMMOGRAPHY | Facility: HOSPITAL | Age: 56
Discharge: HOME | End: 2021-01-19
Payer: COMMERCIAL

## 2021-01-19 VITALS — HEIGHT: 68 IN | WEIGHT: 176 LBS | BODY MASS INDEX: 26.67 KG/M2

## 2021-01-19 DIAGNOSIS — Z12.31 ENCOUNTER FOR SCREENING MAMMOGRAM FOR MALIGNANT NEOPLASM OF BREAST: ICD-10-CM

## 2021-01-19 PROCEDURE — 77067 SCR MAMMO BI INCL CAD: CPT

## 2021-01-19 PROCEDURE — 77063 BREAST TOMOSYNTHESIS BI: CPT

## 2021-02-20 DIAGNOSIS — G43.709 CHRONIC MIGRAINE WITHOUT AURA WITHOUT STATUS MIGRAINOSUS, NOT INTRACTABLE: ICD-10-CM

## 2021-03-10 DIAGNOSIS — Z23 ENCOUNTER FOR IMMUNIZATION: ICD-10-CM

## 2021-03-16 ENCOUNTER — IMMUNIZATIONS (OUTPATIENT)
Dept: FAMILY MEDICINE CLINIC | Facility: HOSPITAL | Age: 56
End: 2021-03-16

## 2021-03-16 DIAGNOSIS — Z23 ENCOUNTER FOR IMMUNIZATION: Primary | ICD-10-CM

## 2021-03-16 PROCEDURE — 0001A SARS-COV-2 / COVID-19 MRNA VACCINE (PFIZER-BIONTECH) 30 MCG: CPT

## 2021-03-16 PROCEDURE — 91300 SARS-COV-2 / COVID-19 MRNA VACCINE (PFIZER-BIONTECH) 30 MCG: CPT

## 2021-03-22 ENCOUNTER — TELEPHONE (OUTPATIENT)
Dept: NEUROLOGY | Facility: CLINIC | Age: 56
End: 2021-03-22

## 2021-03-22 NOTE — TELEPHONE ENCOUNTER
----- Message from Sujata Kinney sent at 3/22/2021  3:07 PM EDT -----  Regarding: Non-Urgent Medical Question  Contact: [phone]  REQUEST AN APPOINTMENT CHANGE  Hi I have an appointment Mar 25 at 1:00 that I have to reschedule  I tried to call but no one picked up  Could you please contact me about setting up another time? Thank you

## 2021-03-22 NOTE — TELEPHONE ENCOUNTER
Called pt, she would be willing to do virtual visit  appt change to virtual video   made aware    Will add pt to teams

## 2021-03-25 ENCOUNTER — TELEPHONE (OUTPATIENT)
Dept: NEUROLOGY | Facility: CLINIC | Age: 56
End: 2021-03-25

## 2021-03-25 ENCOUNTER — TELEMEDICINE (OUTPATIENT)
Dept: NEUROLOGY | Facility: CLINIC | Age: 56
End: 2021-03-25
Payer: COMMERCIAL

## 2021-03-25 DIAGNOSIS — G43.709 CHRONIC MIGRAINE WITHOUT AURA WITHOUT STATUS MIGRAINOSUS, NOT INTRACTABLE: Primary | ICD-10-CM

## 2021-03-25 DIAGNOSIS — E55.9 VITAMIN D DEFICIENCY: ICD-10-CM

## 2021-03-25 PROCEDURE — 99213 OFFICE O/P EST LOW 20 MIN: CPT | Performed by: PHYSICIAN ASSISTANT

## 2021-03-25 NOTE — TELEPHONE ENCOUNTER
Called patient and I left a message to call back to schedule a 6 month follow up  If the patient calls back please schedule appointment

## 2021-04-06 ENCOUNTER — IMMUNIZATIONS (OUTPATIENT)
Dept: FAMILY MEDICINE CLINIC | Facility: HOSPITAL | Age: 56
End: 2021-04-06

## 2021-04-06 DIAGNOSIS — Z23 ENCOUNTER FOR IMMUNIZATION: Primary | ICD-10-CM

## 2021-04-06 PROCEDURE — 0002A SARS-COV-2 / COVID-19 MRNA VACCINE (PFIZER-BIONTECH) 30 MCG: CPT

## 2021-04-06 PROCEDURE — 91300 SARS-COV-2 / COVID-19 MRNA VACCINE (PFIZER-BIONTECH) 30 MCG: CPT

## 2021-06-15 DIAGNOSIS — G43.709 CHRONIC MIGRAINE WITHOUT AURA WITHOUT STATUS MIGRAINOSUS, NOT INTRACTABLE: ICD-10-CM

## 2021-06-15 RX ORDER — UBROGEPANT 50 MG/1
TABLET ORAL
Qty: 10 TABLET | Refills: 3 | Status: SHIPPED | OUTPATIENT
Start: 2021-06-15 | End: 2021-11-08

## 2021-06-24 ENCOUNTER — TELEPHONE (OUTPATIENT)
Dept: NEUROLOGY | Facility: CLINIC | Age: 56
End: 2021-06-24

## 2021-06-24 NOTE — TELEPHONE ENCOUNTER
pt called and states that she is having issues with memory, fotten worse over the past couple months and headache have been getting worse for the past month  headaches are mostly everyday now, on and off, but most of the time start in the evening and then has to lay down to get them to go away  taking Yuri Carry almost every day  cymbalta 90mg per psychiatry  Continue hydroxyzine at bedtime  Continue clonidine at bedtime  Continue clonazepam at bedtime  cyproheptadine 4-8mg prn-usually takes 2 tabs  has not been [employer]  felt that she was getting better and was getting more relief with Yuri Carry so she stopped emgality  stopped about 3-4 months ago  when she takes [place] Carry, it sometimes aborts the migraine  states that she has a scritp for 50mg and 100mg of ubrelvy and she usually splits the 100 mg in half   has ocassionally taken 100mg and did not see much of a change compared to 50mg and that is why she only takes 50mg  she would need a mew script for emgality if you would like her to restart emgality  she is agreeable to restarting if that is what is recommended    [phone] to leave detailed message

## 2021-06-24 NOTE — TELEPHONE ENCOUNTER
Have her start the [facility] again, sent a new prescription to the pharmacy  Memory is likely due to her medication and her depression    Should talk to her therapist or psychiatrist about this

## 2021-07-29 DIAGNOSIS — G43.709 CHRONIC MIGRAINE WITHOUT AURA WITHOUT STATUS MIGRAINOSUS, NOT INTRACTABLE: ICD-10-CM

## 2021-07-29 RX ORDER — UBROGEPANT 100 MG/1
TABLET ORAL
Qty: 10 TABLET | Refills: 3 | Status: SHIPPED | OUTPATIENT
Start: 2021-07-29 | End: 2021-12-05

## 2021-08-03 ENCOUNTER — TELEPHONE (OUTPATIENT)
Dept: NEUROLOGY | Facility: CLINIC | Age: 56
End: 2021-08-03

## 2021-08-03 NOTE — TELEPHONE ENCOUNTER
Called patient and I left a message to call back to get her chart ready for her virtual video visit today with Arin

## 2021-11-06 DIAGNOSIS — G43.709 CHRONIC MIGRAINE WITHOUT AURA WITHOUT STATUS MIGRAINOSUS, NOT INTRACTABLE: ICD-10-CM

## 2021-11-08 RX ORDER — UBROGEPANT 50 MG/1
TABLET ORAL
Qty: 10 TABLET | Refills: 3 | Status: SHIPPED | OUTPATIENT
Start: 2021-11-08 | End: 2022-03-03

## 2021-11-11 ENCOUNTER — TELEPHONE (OUTPATIENT)
Dept: NEUROLOGY | Facility: CLINIC | Age: 56
End: 2021-11-11

## 2021-12-03 DIAGNOSIS — G43.709 CHRONIC MIGRAINE WITHOUT AURA WITHOUT STATUS MIGRAINOSUS, NOT INTRACTABLE: ICD-10-CM

## 2021-12-05 RX ORDER — UBROGEPANT 100 MG/1
TABLET ORAL
Qty: 10 TABLET | Refills: 3 | Status: SHIPPED | OUTPATIENT
Start: 2021-12-05

## 2022-03-03 DIAGNOSIS — G43.709 CHRONIC MIGRAINE WITHOUT AURA WITHOUT STATUS MIGRAINOSUS, NOT INTRACTABLE: ICD-10-CM

## 2022-03-03 RX ORDER — UBROGEPANT 50 MG/1
TABLET ORAL
Qty: 10 TABLET | Refills: 3 | Status: SHIPPED | OUTPATIENT
Start: 2022-03-03 | End: 2022-07-12

## 2024-11-12 ENCOUNTER — OFFICE VISIT (OUTPATIENT)
Dept: OBGYN CLINIC | Facility: CLINIC | Age: 59
End: 2024-11-12
Payer: COMMERCIAL

## 2024-11-12 ENCOUNTER — HOSPITAL ENCOUNTER (OUTPATIENT)
Dept: RADIOLOGY | Facility: HOSPITAL | Age: 59
Discharge: HOME | End: 2024-11-12
Attending: ORTHOPAEDIC SURGERY
Payer: COMMERCIAL

## 2024-11-12 VITALS — WEIGHT: 161.4 LBS | BODY MASS INDEX: 24.46 KG/M2 | OXYGEN SATURATION: 99 % | HEART RATE: 85 BPM | HEIGHT: 68 IN

## 2024-11-12 DIAGNOSIS — M22.2X2 PATELLOFEMORAL SYNDROME, BILATERAL: Primary | ICD-10-CM

## 2024-11-12 DIAGNOSIS — M17.12 PRIMARY OSTEOARTHRITIS OF LEFT KNEE: ICD-10-CM

## 2024-11-12 DIAGNOSIS — M25.562 PAIN IN BOTH KNEES, UNSPECIFIED CHRONICITY: ICD-10-CM

## 2024-11-12 DIAGNOSIS — M25.561 PAIN IN BOTH KNEES, UNSPECIFIED CHRONICITY: ICD-10-CM

## 2024-11-12 DIAGNOSIS — M22.2X1 PATELLOFEMORAL SYNDROME, BILATERAL: Primary | ICD-10-CM

## 2024-11-12 PROCEDURE — 99204 OFFICE O/P NEW MOD 45 MIN: CPT | Performed by: ORTHOPAEDIC SURGERY

## 2024-11-12 PROCEDURE — 73562 X-RAY EXAM OF KNEE 3: CPT

## 2024-11-12 RX ORDER — MELOXICAM 15 MG/1
15 TABLET ORAL DAILY
COMMUNITY
Start: 2024-09-05

## 2024-11-12 RX ORDER — METHOCARBAMOL 750 MG/1
750 TABLET, FILM COATED ORAL 4 TIMES DAILY PRN
COMMUNITY
Start: 2024-08-08

## 2024-11-12 RX ORDER — DICYCLOMINE HYDROCHLORIDE 10 MG/1
10 CAPSULE ORAL 3 TIMES DAILY PRN
COMMUNITY
Start: 2024-10-30 | End: 2025-10-30

## 2024-11-12 NOTE — PROGRESS NOTES
Assessment:   Diagnosis ICD-10-CM Associated Orders   1. Patellofemoral syndrome, bilateral  M22.2X1 Ambulatory Referral to Physical Therapy    M22.2X2       2. Primary osteoarthritis of left knee  M17.12 Ambulatory Referral to Physical Therapy      3. Pain in both knees, unspecified chronicity  M25.561 XR knee 3 vw left non injury    M25.562 XR knee 3 vw right non injury          Plan:  Bilateral knee x-rays were performed in the office and reviewed demonstrating mild to moderate left knee lateral compartmental degenerative changes. No significant degenerative changes on the right side   It was discussed with Lauren and her  that her signs and symptoms are consistent with bilateral knee patellofemoral syndrome and mild to moderate left knee lateral compartmental degenerative changes  Lauren was provided with VMO exercises in AVS  PT was ordered for VMO stretching and strengthening exercises as well as all modalities as seen fit   I will see Lauren back in the office on an as needed basis     To do next visit:  Return if symptoms worsen or fail to improve.    The above stated was discussed in layman's terms and the patient expressed understanding.  All questions were answered to the patient's satisfaction.       Scribe Attestation      I,:  Elise Ellis MA am acting as a scribe while in the presence of the attending physician.:       I,:  Tracy Aviles MD personally performed the services described in this documentation    as scribed in my presence.:               Subjective:   Lauren Ledezma is a 59 y.o. female who presents to the office for bilateral knee pain. Lauren notes pain to the sides of her right knee. Pain has been ongoing for approx. 1.5 years. She notes her right knee is slightly worse then her left. Right knee pain has improved somewhat since undergoing a right hip bursa CSI with OAA. Pain will worsen with ambulation and with steps. She has been treating with Dr. Tellez at [facility] regarding her  knees. She has undergone knee CSI's as well as Visco injections. She notes Visco injections are more beneficial for her then the CSI's. Her most recent Visco injection was in September. She will wear a soft right knee brace/sleeve on an as needed basis. She will also take Advil on an as needed basis.       Review of systems negative unless otherwise specified in HPI  Review of Systems   Constitutional:  Negative for chills, fever and unexpected weight change.   HENT:  Negative for hearing loss, nosebleeds and sore throat.    Eyes:  Negative for pain, redness and visual disturbance.   Respiratory:  Negative for cough, shortness of breath and wheezing.    Cardiovascular:  Negative for chest pain, palpitations and leg swelling.   Gastrointestinal:  Negative for abdominal pain, nausea and vomiting.   Endocrine: Negative for polydipsia and polyuria.   Genitourinary:  Negative for difficulty urinating and hematuria.   Musculoskeletal:  Positive for arthralgias. Negative for joint swelling and myalgias.   Skin:  Negative for rash and wound.   Neurological:  Negative for dizziness, numbness and headaches.   Psychiatric/Behavioral:  Negative for decreased concentration, dysphoric mood and suicidal ideas. The patient is not nervous/anxious.        Past Medical History:   Diagnosis Date    Endometrial cancer (HCC) 2011    Last assessed: 2/8/17; age 46    Sinusitis     Streptococcal pharyngitis        Past Surgical History:   Procedure Laterality Date    CATARACT EXTRACTION, BILATERAL      DIAGNOSTIC LAPAROSCOPY      HYSTERECTOMY      LAPAROSCOPIC TOTAL HYSTERECTOMY      MOLE REMOVAL  07/12/2019    TOTAL ABDOMINAL HYSTERECTOMY W/ BILATERAL SALPINGOOPHORECTOMY Bilateral 2011    Last assessed: 2/8/17; age 46       Family History   Problem Relation Age of Onset    Hypertension Mother     Breast cancer Mother 49    Other Father         pace maker     Prostate cancer Father 65    Colon cancer Father 73    Anxiety disorder Sister      Depression Sister     No Known Problems Maternal Grandmother     No Known Problems Maternal Grandfather     No Known Problems Paternal Grandmother     No Known Problems Paternal Grandfather     No Known Problems Maternal Aunt     No Known Problems Maternal Aunt     No Known Problems Paternal Aunt        Social History     Occupational History    Occupation: Housewife or Homemaker   Tobacco Use    Smoking status: Never    Smokeless tobacco: Never    Tobacco comments:     No second hand smoke exposure   Vaping Use    Vaping status: Never Used   Substance and Sexual Activity    Alcohol use: No    Drug use: No    Sexual activity: Not on file         Current Outpatient Medications:     acetaminophen (TYLENOL) 500 mg tablet, Take 1,000 mg by mouth as needed for mild pain (3-4 times a week) , Disp: , Rfl:     ALPRAZolam (XANAX) 0.5 mg tablet, Take 0.5 mg by mouth daily as needed, Disp: , Rfl:     cloNIDine (CATAPRES) 0.1 mg tablet, Take 0.2 mg by mouth daily at bedtime , Disp: , Rfl: 0    cloNIDine (CATAPRES) 0.2 mg tablet, Take 0.2 mg by mouth daily , Disp: , Rfl: 0    cyproheptadine (PERIACTIN) 4 mg tablet, TAKE 1 TABLET (4 MG TOTAL) BY MOUTH DAILY AT BEDTIME AS NEEDED FOR ALLERGIES (HEADACHES), Disp: 90 tablet, Rfl: 1    dexamethasone (DECADRON) 1 mg tablet, Take 1 tablet (1 mg total) by mouth daily with breakfast (Patient taking differently: Take 1 mg by mouth as needed), Disp: 5 tablet, Rfl: 0    dicyclomine (BENTYL) 10 mg capsule, Take 10 mg by mouth Three times daily as needed, Disp: , Rfl:     DULoxetine (CYMBALTA) 30 mg delayed release capsule, Take 90 mg by mouth daily, Disp: , Rfl: 0    estradiol (ESTRACE) 0.1 mg/g vaginal cream, Insert 2 g into the vagina as needed , Disp: , Rfl: 3    eszopiclone (LUNESTA) 3 MG tablet, Take 3 mg by mouth daily, Disp: , Rfl: 0    hydrOXYzine HCL (ATARAX) 25 mg tablet, Take 50 mg by mouth daily at bedtime , Disp: , Rfl:     hydrOXYzine HCL (ATARAX) 50 mg tablet, Take 50 mg by  mouth daily at bedtime Alternates with the 25 mg., Disp: , Rfl: 0    ibuprofen (MOTRIN) 200 mg tablet, Take 400 mg by mouth as needed for mild pain, Disp: , Rfl:     L-Methylfolate-Algae (DEPLIN 15) 15-90.314 MG CAPS, Take 1 capsule by mouth daily, Disp: , Rfl: 0    lidocaine (XYLOCAINE) 5 % ointment, Apply 1 application topically as needed , Disp: , Rfl:     meloxicam (MOBIC) 15 mg tablet, Take 15 mg by mouth daily, Disp: , Rfl:     methocarbamol (ROBAXIN) 750 mg tablet, Take 750 mg by mouth 4 (four) times a day as needed, Disp: , Rfl:     Multiple Vitamins-Minerals (EYE SUPPORT PO), Take 1 tablet by mouth daily , Disp: , Rfl:     OLANZapine (ZyPREXA) 5 mg tablet, Take 1 tablet (5 mg total) by mouth daily at bedtime, Disp: 5 tablet, Rfl: 0    prochlorperazine (COMPAZINE) 10 mg tablet, Take 1 tablet (10 mg total) by mouth every 8 (eight) hours as needed (migraine), Disp: 10 tablet, Rfl: 0    triamcinolone (KENALOG) 0.1 % cream, APPLY TO SKIN TWICE DAILY X 1-2 WEEKS THEN AS NEEDED FOR RASH, Disp: , Rfl: 1    Ubrelvy 100 MG tablet, TAKE 1 TABLET BY MOUTH AS NEEDED (MIGRAINE) MAY REPEAT IN 2 HOURS IF NEEDED. MAX 2 IN 24 HOURS, Disp: 10 tablet, Rfl: 3    valACYclovir (VALTREX) 500 mg tablet, Take 1 tablet by mouth daily, Disp: , Rfl:     ALPHAGAN P 0.1 %, INSERT 1 DROPS OPHTHALMICALLY EVERY 12 HOURS INTO BOTH EYES, Disp: , Rfl: 1    ALPRAZolam (XANAX) 0.25 mg tablet, TAKE 1 TABLET (0.25 MG TOTAL) BY MOUTH 2 (TWO) TIMES A DAY AS NEEDED FOR ANXIETY (Patient not taking: Reported on 3/25/2021), Disp: 30 tablet, Rfl: 0    clonazePAM (KlonoPIN) 0.5 mg tablet, Take 0.5 mg by mouth daily , Disp: , Rfl:     Galcanezumab-gnlm 120 MG/ML SOAJ, Inject 120 mg under the skin every 30 (thirty) days Starting second month, Disp: 1 mL, Rfl: 11    Multiple Vitamins-Minerals (VISION FORMULA 2 PO), Take by mouth daily VISION FORMULA EYE HEALTH ORAL CAPSULE, Disp: , Rfl:     OnabotulinumtoxinA (BOTOX IJ), Inject as directed, Disp: , Rfl:      tolnaftate (TINACTIN) 1 % external solution, 1 mL, Disp: , Rfl:     Travoprost (TRAVATAN OP), Administer 1 drop to both eyes daily, Disp: , Rfl:     Ubrelvy 50 MG tablet, TAKE AT TABLET AT ONSET OF MIGRAINE, MAY REPEAT IN 2 HOURS IF NEEDED., Disp: 10 tablet, Rfl: 0    No Known Allergies         Vitals:    11/12/24 0822   Pulse: 85   SpO2: 99%       Body mass index is 24.54 kg/m².  Wt Readings from Last 3 Encounters:   11/12/24 73.2 kg (161 lb 6.4 oz)   01/19/21 79.8 kg (176 lb)   09/29/20 79.8 kg (176 lb)       Objective:                    Right Knee Exam     Tenderness   The patient is experiencing tenderness in the medial joint line and lateral joint line.    Range of Motion   Extension:  0 normal   Flexion:  120 normal     Tests   Lachman:  Anterior - negative    Posterior - negative  Drawer:  Anterior - negative    Posterior - negative    Other   Erythema: absent  Scars: absent  Sensation: normal  Pulse: present  Swelling: none  Effusion: effusion (minimal) present      Left Knee Exam     Tenderness   The patient is experiencing tenderness in the lateral joint line.    Range of Motion   Extension:  0   Flexion:  120 normal     Tests   Lachman:  Anterior - negative    Posterior - negative  Drawer:  Anterior - negative     Posterior - negative    Other   Erythema: absent  Scars: absent  Sensation: normal  Pulse: present    Comments:  Valgus correctable with varus stress   No medial joint line tenderness       Right Hip Exam     Other   Erythema: absent  Scars: absent  Sensation: normal  Pulse: present    Comments:  - faddier and jasson test   No greater trochanteric bursa tenderness   - stinchfeild test             Diagnostics, reviewed and taken today if performed as documented:    None performed      The attending physician has personally reviewed the pertinent films in PACS and interpretation is as follows:  X-rays of bilateral knees obtained in the office today demonstrate mild to moderate left knee lateral  "compartmental degenerative changes. No significant right knee degenerative changes. No acute fracture or dislocation.       Procedures, if performed today:    Procedures    None performed      Portions of the record may have been created with voice recognition software.  Occasional wrong word or \"sound a like\" substitutions may have occurred due to the inherent limitations of voice recognition software.  Read the chart carefully and recognize, using context, where substitutions have occurred.    "

## 2024-11-12 NOTE — PATIENT INSTRUCTIONS
STRENGTHENING:   Quadriceps:   Isometric Quad sets                        Progression for Quadriceps/VMO:  Hold at 45 degree angle (Picture #1)    Key: Slow & Intentional  Two ways to perform:  Start with 10 reps on each side maintaining neutral pelvis. *   Hold position for a 3-5 count  When form and exercise because less challenging; increase the REPITITIONS or DURATION your holding.    Perform Wall Squats below: 3 Sets of 10 Reps         Hold for 3-5 count          Progression for Quadriceps/VMO:  Use a ball hold between knees to activate VMO  Key: Slow & Intentional  Two ways to perform:  Start with 10 reps on each side maintaining neutral pelvis. *   Hold position for a 3-5 count  When form and exercise because less challenging; increase the REPITITIONS or DURATION your holding.  Perform the Wall Squat w/ball: 3 sets 10 reps        Hold for 3-5 count                        Progression:   Adding a Theraband for resistance while maintaining good form during a wall squat is more challenging.   Perform Wall squat w/Theraband:   3 sets 10 reps  Hold for a 3-5 count

## 2025-01-16 ENCOUNTER — HOSPITAL ENCOUNTER (INPATIENT)
Facility: HOSPITAL | Age: 60
LOS: 2 days | Discharge: HOME | DRG: 811 | End: 2025-01-18
Attending: EMERGENCY MEDICINE | Admitting: INTERNAL MEDICINE
Payer: COMMERCIAL

## 2025-01-16 DIAGNOSIS — K21.00 GASTROESOPHAGEAL REFLUX DISEASE WITH ESOPHAGITIS: ICD-10-CM

## 2025-01-16 DIAGNOSIS — K92.2 GASTROINTESTINAL HEMORRHAGE, UNSPECIFIED GASTROINTESTINAL HEMORRHAGE TYPE: ICD-10-CM

## 2025-01-16 DIAGNOSIS — D64.9 SYMPTOMATIC ANEMIA: Primary | ICD-10-CM

## 2025-01-16 DIAGNOSIS — D62 ACUTE BLOOD LOSS ANEMIA: ICD-10-CM

## 2025-01-16 DIAGNOSIS — D50.9 IRON DEFICIENCY ANEMIA: ICD-10-CM

## 2025-01-16 PROBLEM — R06.00 DYSPNEA: Status: ACTIVE | Noted: 2025-01-16

## 2025-01-16 LAB
ABO GROUP BLD: NORMAL
ABO GROUP BLD: NORMAL
ALBUMIN SERPL BCG-MCNC: 3.9 G/DL (ref 3.5–5)
ALP SERPL-CCNC: 76 U/L (ref 34–104)
ALT SERPL W P-5'-P-CCNC: 17 U/L (ref 7–52)
ANION GAP SERPL CALCULATED.3IONS-SCNC: 5 MMOL/L (ref 4–13)
AST SERPL W P-5'-P-CCNC: 18 U/L (ref 13–39)
BASOPHILS # BLD AUTO: 0.02 THOUSANDS/ΜL (ref 0–0.1)
BASOPHILS NFR BLD AUTO: 0 % (ref 0–1)
BILIRUB SERPL-MCNC: 0.34 MG/DL (ref 0.2–1)
BLD GP AB SCN SERPL QL: NEGATIVE
BUN SERPL-MCNC: 21 MG/DL (ref 5–25)
CALCIUM SERPL-MCNC: 8.7 MG/DL (ref 8.4–10.2)
CHLORIDE SERPL-SCNC: 108 MMOL/L (ref 96–108)
CO2 SERPL-SCNC: 26 MMOL/L (ref 21–32)
CREAT SERPL-MCNC: 0.9 MG/DL (ref 0.6–1.3)
EOSINOPHIL # BLD AUTO: 0.03 THOUSAND/ΜL (ref 0–0.61)
EOSINOPHIL NFR BLD AUTO: 1 % (ref 0–6)
ERYTHROCYTE [DISTWIDTH] IN BLOOD BY AUTOMATED COUNT: 13.8 % (ref 11.6–15.1)
GFR SERPL CREATININE-BSD FRML MDRD: 69 ML/MIN/1.73SQ M
GLUCOSE SERPL-MCNC: 86 MG/DL (ref 65–140)
HCT VFR BLD AUTO: 27 % (ref 34.8–46.1)
HGB BLD-MCNC: 8.3 G/DL (ref 11.5–15.4)
IMM GRANULOCYTES # BLD AUTO: 0.02 THOUSAND/UL (ref 0–0.2)
IMM GRANULOCYTES NFR BLD AUTO: 0 % (ref 0–2)
LYMPHOCYTES # BLD AUTO: 1.24 THOUSANDS/ΜL (ref 0.6–4.47)
LYMPHOCYTES NFR BLD AUTO: 25 % (ref 14–44)
MCH RBC QN AUTO: 28.8 PG (ref 26.8–34.3)
MCHC RBC AUTO-ENTMCNC: 30.7 G/DL (ref 31.4–37.4)
MCV RBC AUTO: 94 FL (ref 82–98)
MONOCYTES # BLD AUTO: 0.37 THOUSAND/ΜL (ref 0.17–1.22)
MONOCYTES NFR BLD AUTO: 7 % (ref 4–12)
NEUTROPHILS # BLD AUTO: 3.31 THOUSANDS/ΜL (ref 1.85–7.62)
NEUTS SEG NFR BLD AUTO: 67 % (ref 43–75)
NRBC BLD AUTO-RTO: 0 /100 WBCS
PLATELET # BLD AUTO: 217 THOUSANDS/UL (ref 149–390)
PMV BLD AUTO: 9.9 FL (ref 8.9–12.7)
POTASSIUM SERPL-SCNC: 4.3 MMOL/L (ref 3.5–5.3)
PROT SERPL-MCNC: 6 G/DL (ref 6.4–8.4)
RBC # BLD AUTO: 2.88 MILLION/UL (ref 3.81–5.12)
RETICS # AUTO: ABNORMAL 10*3/UL (ref 14097–95744)
RETICS # CALC: 2.86 % (ref 0.37–1.87)
RH BLD: POSITIVE
RH BLD: POSITIVE
SODIUM SERPL-SCNC: 139 MMOL/L (ref 135–147)
SPECIMEN EXPIRATION DATE: NORMAL
WBC # BLD AUTO: 4.99 THOUSAND/UL (ref 4.31–10.16)

## 2025-01-16 PROCEDURE — 86850 RBC ANTIBODY SCREEN: CPT | Performed by: EMERGENCY MEDICINE

## 2025-01-16 PROCEDURE — 86923 COMPATIBILITY TEST ELECTRIC: CPT

## 2025-01-16 PROCEDURE — 36415 COLL VENOUS BLD VENIPUNCTURE: CPT

## 2025-01-16 PROCEDURE — 85045 AUTOMATED RETICULOCYTE COUNT: CPT

## 2025-01-16 PROCEDURE — 86901 BLOOD TYPING SEROLOGIC RH(D): CPT | Performed by: EMERGENCY MEDICINE

## 2025-01-16 PROCEDURE — 83550 IRON BINDING TEST: CPT

## 2025-01-16 PROCEDURE — 82607 VITAMIN B-12: CPT

## 2025-01-16 PROCEDURE — 82728 ASSAY OF FERRITIN: CPT

## 2025-01-16 PROCEDURE — P9016 RBC LEUKOCYTES REDUCED: HCPCS

## 2025-01-16 PROCEDURE — 99284 EMERGENCY DEPT VISIT MOD MDM: CPT

## 2025-01-16 PROCEDURE — 85025 COMPLETE CBC W/AUTO DIFF WBC: CPT

## 2025-01-16 PROCEDURE — 83540 ASSAY OF IRON: CPT

## 2025-01-16 PROCEDURE — 80053 COMPREHEN METABOLIC PANEL: CPT

## 2025-01-16 PROCEDURE — 99285 EMERGENCY DEPT VISIT HI MDM: CPT | Performed by: EMERGENCY MEDICINE

## 2025-01-16 PROCEDURE — 36430 TRANSFUSION BLD/BLD COMPNT: CPT

## 2025-01-16 PROCEDURE — 86900 BLOOD TYPING SEROLOGIC ABO: CPT | Performed by: EMERGENCY MEDICINE

## 2025-01-16 PROCEDURE — 30233N1 TRANSFUSION OF NONAUTOLOGOUS RED BLOOD CELLS INTO PERIPHERAL VEIN, PERCUTANEOUS APPROACH: ICD-10-PCS | Performed by: INTERNAL MEDICINE

## 2025-01-16 PROCEDURE — 93005 ELECTROCARDIOGRAM TRACING: CPT

## 2025-01-16 PROCEDURE — 99222 1ST HOSP IP/OBS MODERATE 55: CPT | Performed by: INTERNAL MEDICINE

## 2025-01-16 RX ORDER — CLONIDINE HYDROCHLORIDE 0.1 MG/1
0.2 TABLET ORAL DAILY
Status: DISCONTINUED | OUTPATIENT
Start: 2025-01-17 | End: 2025-01-18 | Stop reason: HOSPADM

## 2025-01-16 RX ORDER — PANTOPRAZOLE SODIUM 40 MG/10ML
40 INJECTION, POWDER, LYOPHILIZED, FOR SOLUTION INTRAVENOUS EVERY 12 HOURS SCHEDULED
Status: DISCONTINUED | OUTPATIENT
Start: 2025-01-17 | End: 2025-01-18 | Stop reason: HOSPADM

## 2025-01-16 RX ORDER — DICYCLOMINE HYDROCHLORIDE 10 MG/1
10 CAPSULE ORAL 3 TIMES DAILY PRN
Status: DISCONTINUED | OUTPATIENT
Start: 2025-01-16 | End: 2025-01-18 | Stop reason: HOSPADM

## 2025-01-16 RX ORDER — PANTOPRAZOLE SODIUM 40 MG/10ML
40 INJECTION, POWDER, LYOPHILIZED, FOR SOLUTION INTRAVENOUS ONCE
Status: COMPLETED | OUTPATIENT
Start: 2025-01-16 | End: 2025-01-16

## 2025-01-16 RX ORDER — HYDROXYZINE HYDROCHLORIDE 25 MG/1
50 TABLET, FILM COATED ORAL
Status: DISCONTINUED | OUTPATIENT
Start: 2025-01-16 | End: 2025-01-18 | Stop reason: HOSPADM

## 2025-01-16 RX ORDER — VALACYCLOVIR HYDROCHLORIDE 500 MG/1
500 TABLET, FILM COATED ORAL DAILY
Status: DISCONTINUED | OUTPATIENT
Start: 2025-01-17 | End: 2025-01-18 | Stop reason: HOSPADM

## 2025-01-16 RX ORDER — ESZOPICLONE 3 MG/1
3 TABLET, FILM COATED ORAL
Status: DISCONTINUED | OUTPATIENT
Start: 2025-01-16 | End: 2025-01-18 | Stop reason: HOSPADM

## 2025-01-16 RX ORDER — SODIUM CHLORIDE 9 MG/ML
100 INJECTION, SOLUTION INTRAVENOUS CONTINUOUS
Status: DISCONTINUED | OUTPATIENT
Start: 2025-01-16 | End: 2025-01-17

## 2025-01-16 RX ADMIN — PANTOPRAZOLE SODIUM 40 MG: 40 INJECTION, POWDER, FOR SOLUTION INTRAVENOUS at 18:29

## 2025-01-16 RX ADMIN — HYDROXYZINE HYDROCHLORIDE 50 MG: 25 TABLET ORAL at 21:48

## 2025-01-16 RX ADMIN — SODIUM CHLORIDE 100 ML/HR: 0.9 INJECTION, SOLUTION INTRAVENOUS at 22:21

## 2025-01-16 NOTE — H&P
H&P - Hospitalist   Name: Lauren Ledezma 59 y.o. female I MRN: [id number]  Unit/Bed#: -01 I Date of Admission: 1/16/2025   Date of Service: 1/16/2025 I Hospital Day: 0     Assessment & Plan  Anemia  60 YO F with hx of migraines presented with symptomatic anemia endorsing progressive dyspnea on exertion and generalized weakness. Of note she reports having a GI viral illness over the past month and during that time endorsed dark tarry stools which have since resolved. Denies any hematemesis, and since then denies any hematochezia/melena. Per review of labs in Care Everywhere, pt's hemoglobin baseline appears to be 14, which was the case up until November. Recent outpatient labs reveal Hb of 8.8 last week, and on admission today 8.3. VS on arrival -- BP and heart rate wnl. Pt was given 1u pRBC in ED d/t symptomatic anemia. Stool guaiac positive.    Of note, pt frequently uses NSAIDs for migraines and MSK pain, as well as recent viral illness and use of dexamethasone. Given pt's presentation unlikely to be an active bleed. However given pt is experiencing symptomatic anemia will admit for further work up and plan for EGD tomorrow, pending GI consult.     Consult to GI pending  Anticipate EGD tomorrow. NPO at midnight.   IV Protonix 40 mg BID  IV iron infusion  Pending iron studies, retic count, B12, folate   Repeat CBC in the AM  Dyspnea  Pt's dyspnea and timeline of symptoms coincide with decrease in hemoglobin. Further workup will address source of anemia and expect resolution of dyspnea. Unlikely to be cardiopulmonary etiology. Per records in Care Everywhere:   Ultrasound LLE 01/07/25 for LE swelling: normal study; no sonographic evidence of DVT in visualized LLE  Echo 12/04/24: LV - systolic function normal with EF of 61-65%, no diastolic dysfunction. Mild tricuspid regurgitation. No pericardial effusion.   Currently denies any chest pain or palpitations. VS stable. EKG wnl.   Given timeline of symptoms, this  dyspnea is most likely 2/2 symptomatic anemia.   Gastroesophageal reflux disease with esophagitis  IV Protonix 40 mg BID   Plan for EGD tomorrow  Avoid NSAIDs  See principal problem for further details.  Anxiety and depression  Pt w hx of anxiety and depression, states it has been well controlled on PTA Cymbalta 90 mg PO qd and Atarax 50 mg PO q HS. Pt states she has not been taking any of the other psychiatric medications on her home rx list. Will continue the ones she reports taking as above.       VTE Pharmacologic Prophylaxis: VTE Score: 5 Moderate Risk (Score 3-4) - Pharmacological DVT Prophylaxis Contraindicated. Sequential Compression Devices Ordered.  Code Status: Level 1 - Full Code  Discussion with family: Updated  () at bedside.    Anticipated Length of Stay: Patient will be admitted on an inpatient basis with an anticipated length of stay of greater than 2 midnights secondary to symptomatic anemia .    History of Present Illness   Chief Complaint: progressive shortness of breath    Lauren Ledezma is a 59 y.o. female with a PMH of migraines who presents with progressive dyspnea on exertion and generalized weakness. Earlier this month she received outpatient blood work, and per review of labs she had a drop of hemoglobin from 15 (November) to 8. She reports having a viral illness over the past month and during that time endorsed dark tarry stools which have since resolved. She denies any hematemesis, and since the viral illness she denies any hematochezia/melena. Pt frequently uses NSAIDs for migraines and musculoskeletal pain, as well as use of dexamethasone last month for an illness. Denies using any anticoagulation at home. Per review of labs in Care Everywhere, pt has been following with [facility] specialists. She received LLE ultrasound and echocardiogram outpatient which were both unremarkable for any acute abnormalities. In the ED pt's VS were stable. Her stool was dark brown, but  guaiac positive. Pt was given 1u pRBC in ED d/t symptomatic anemia.     On examination pt has non-tender, non-distended abdomen. She is alert and oriented x3. She does have bilateral lower extremity edema which she states she has had for several months; no erythema or pain. She denies any current chest pain or palpitations and reports her dyspnea has resolved since being in the bed.  Denies any lightheadedness, dizziness, and syncopal episodes. She is agreeable to being seen by GI tomorrow and undergoing EGD.  at bedside. All questions and concerns addressed.       Review of Systems   Constitutional:  Positive for fatigue. Negative for chills and fever.   HENT:  Negative for ear pain, sinus pain and sore throat.    Eyes:  Negative for photophobia.   Respiratory:  Positive for shortness of breath. Negative for cough.    Cardiovascular:  Positive for leg swelling (chronic). Negative for chest pain and palpitations.   Gastrointestinal:  Negative for abdominal pain, nausea and vomiting.   Genitourinary:  Negative for dysuria and hematuria.   Musculoskeletal:  Negative for arthralgias and myalgias.   Neurological:  Negative for dizziness, tremors and headaches.   Psychiatric/Behavioral:  Negative for agitation and confusion.        Historical Information   Past Medical History:   Diagnosis Date    Endometrial cancer (HCC) 2011    Last assessed: 2/8/17; age 46    Sinusitis     Streptococcal pharyngitis      Past Surgical History:   Procedure Laterality Date    CATARACT EXTRACTION, BILATERAL      DIAGNOSTIC LAPAROSCOPY      HYSTERECTOMY      LAPAROSCOPIC TOTAL HYSTERECTOMY      MOLE REMOVAL  07/12/2019    TOTAL ABDOMINAL HYSTERECTOMY W/ BILATERAL SALPINGOOPHORECTOMY Bilateral 2011    Last assessed: 2/8/17; age 46     Social History     Tobacco Use    Smoking status: Never    Smokeless tobacco: Never    Tobacco comments:     No second hand smoke exposure   Vaping Use    Vaping status: Never Used   Substance and  Sexual Activity    Alcohol use: No    Drug use: No    Sexual activity: Not on file     E-Cigarette/Vaping    E-Cigarette Use Never User      E-Cigarette/Vaping Substances    Nicotine No     THC No     CBD No     Flavoring No     Other No     Unknown No      Family History   Problem Relation Age of Onset    Hypertension Mother     Breast cancer Mother 49    Other Father         pace maker     Prostate cancer Father 65    Colon cancer Father 73    Anxiety disorder Sister     Depression Sister     No Known Problems Maternal Grandmother     No Known Problems Maternal Grandfather     No Known Problems Paternal Grandmother     No Known Problems Paternal Grandfather     No Known Problems Maternal Aunt     No Known Problems Maternal Aunt     No Known Problems Paternal Aunt      Social History:  Marital Status: /Civil Union   Patient Pre-hospital Living Situation: Home  Patient Pre-hospital Level of Mobility: walks  Patient Pre-hospital Diet Restrictions: denies    Meds/Allergies   I have reviewed home medications with patient personally.  Prior to Admission medications    Medication Sig Start Date End Date Taking? Authorizing Provider   acetaminophen (TYLENOL) 500 mg tablet Take 1,000 mg by mouth as needed for mild pain (3-4 times a week)     Historical Provider, MD   ALPHAGAN P 0.1 % INSERT 1 DROPS OPHTHALMICALLY EVERY 12 HOURS INTO BOTH EYES 4/29/19   Historical Provider, MD   ALPRAZolam (XANAX) 0.25 mg tablet TAKE 1 TABLET (0.25 MG TOTAL) BY MOUTH 2 (TWO) TIMES A DAY AS NEEDED FOR ANXIETY  Patient not taking: Reported on 3/25/2021 4/8/19   Andie Tellez PA-C   ALPRAZolam (XANAX) 0.5 mg tablet Take 0.5 mg by mouth daily as needed 8/4/20   Historical Provider, MD   clonazePAM (KlonoPIN) 0.5 mg tablet Take 0.5 mg by mouth daily  3/22/18   Historical Provider, MD   cloNIDine (CATAPRES) 0.1 mg tablet Take 0.2 mg by mouth daily at bedtime  3/6/18   Historical Provider, MD   cloNIDine (CATAPRES) 0.2 mg tablet Take  0.2 mg by mouth daily  7/2/19   Historical Provider, MD   cyproheptadine (PERIACTIN) 4 mg tablet TAKE 1 TABLET (4 MG TOTAL) BY MOUTH DAILY AT BEDTIME AS NEEDED FOR ALLERGIES (HEADACHES) 12/21/20   Edie Banks PA-C   dexamethasone (DECADRON) 1 mg tablet Take 1 tablet (1 mg total) by mouth daily with breakfast  Patient taking differently: Take 1 mg by mouth as needed 12/4/19   Edie Banks PA-C   dicyclomine (BENTYL) 10 mg capsule Take 10 mg by mouth Three times daily as needed 10/30/24 10/30/25  Historical Provider, MD   DULoxetine (CYMBALTA) 30 mg delayed release capsule Take 90 mg by mouth daily 1/21/19   Historical Provider, MD   estradiol (ESTRACE) 0.1 mg/g vaginal cream Insert 2 g into the vagina as needed  6/24/19   Historical Provider, MD   eszopiclone (LUNESTA) 3 MG tablet Take 3 mg by mouth daily 1/19/19   Historical Provider, MD   Galcanezumab-gnlm 120 MG/ML SOAJ Inject 120 mg under the skin every 30 (thirty) days Starting second month 6/24/21   Edie Banks PA-C   hydrOXYzine HCL (ATARAX) 25 mg tablet Take 50 mg by mouth daily at bedtime  2/11/20   Historical Provider, MD   hydrOXYzine HCL (ATARAX) 50 mg tablet Take 50 mg by mouth daily at bedtime Alternates with the 25 mg. 3/6/18   Historical Provider, MD   ibuprofen (MOTRIN) 200 mg tablet Take 400 mg by mouth as needed for mild pain    Historical Provider, MD   L-Methylfolate-Algae (DEPLIN 15) 15-90.314 MG CAPS Take 1 capsule by mouth daily 10/25/18   Historical Provider, MD   lidocaine (XYLOCAINE) 5 % ointment Apply 1 application topically as needed     Historical Provider, MD   meloxicam (MOBIC) 15 mg tablet Take 15 mg by mouth daily 9/5/24   Historical Provider, MD   methocarbamol (ROBAXIN) 750 mg tablet Take 750 mg by mouth 4 (four) times a day as needed 8/8/24   Historical Provider, MD   Multiple Vitamins-Minerals (EYE SUPPORT PO) Take 1 tablet by mouth daily     Historical Provider, MD   Multiple Vitamins-Minerals (VISION FORMULA 2 PO) Take by  mouth daily VISION FORMULA EYE HEALTH ORAL CAPSULE    Historical Provider, MD   OLANZapine (ZyPREXA) 5 mg tablet Take 1 tablet (5 mg total) by mouth daily at bedtime 12/4/19   Edie Banks PA-C   OnabotulinumtoxinA (BOTOX IJ) Inject as directed    Historical Provider, MD   prochlorperazine (COMPAZINE) 10 mg tablet Take 1 tablet (10 mg total) by mouth every 8 (eight) hours as needed (migraine) 7/26/19   Edie Banks PA-C   tolnaftate (TINACTIN) 1 % external solution 1 mL    Historical Provider, MD   Travoprost (TRAVATAN OP) Administer 1 drop to both eyes daily    Historical Provider, MD   triamcinolone (KENALOG) 0.1 % cream APPLY TO SKIN TWICE DAILY X 1-2 WEEKS THEN AS NEEDED FOR RASH 5/20/19   Historical Provider, MD   Ubrelvy 100 MG tablet TAKE 1 TABLET BY MOUTH AS NEEDED (MIGRAINE) MAY REPEAT IN 2 HOURS IF NEEDED. MAX 2 IN 24 HOURS 12/5/21   Edie Banks PA-C   Ubrelvy 50 MG tablet TAKE AT TABLET AT ONSET OF MIGRAINE, MAY REPEAT IN 2 HOURS IF NEEDED. 7/12/22   ADAM Carlos   valACYclovir (VALTREX) 500 mg tablet Take 1 tablet by mouth daily 2/8/17   Historical Provider, MD     No Known Allergies    Objective :  Temp:  [98.1 °F (36.7 °C)-98.6 °F (37 °C)] 98.5 °F (36.9 °C)  HR:  [7-98] 7  BP: (133-153)/(61-71) 133/61  Resp:  [16-18] 17  SpO2:  [98 %-99 %] 98 %  O2 Device: None (Room air)    Physical Exam  Vitals reviewed.   Constitutional:       General: She is not in acute distress.     Appearance: She is not toxic-appearing.   HENT:      Head: Normocephalic and atraumatic.      Right Ear: External ear normal.      Left Ear: External ear normal.      Nose: Nose normal.      Mouth/Throat:      Mouth: Mucous membranes are moist.      Pharynx: Oropharynx is clear.   Eyes:      Extraocular Movements: Extraocular movements intact.   Cardiovascular:      Rate and Rhythm: Normal rate and regular rhythm.      Pulses: Normal pulses.   Pulmonary:      Effort: Pulmonary effort is normal. No respiratory  distress.      Breath sounds: No wheezing.   Abdominal:      General: There is no distension.      Tenderness: There is no abdominal tenderness. There is no guarding.   Musculoskeletal:      Cervical back: Normal range of motion and neck supple.      Right lower le+ Pitting Edema present.      Left lower le+ Pitting Edema present.   Skin:     General: Skin is dry.      Capillary Refill: Capillary refill takes 2 to 3 seconds.   Neurological:      Mental Status: She is alert.   Psychiatric:         Behavior: Behavior is cooperative.          Lab Results: I have reviewed the following results:  Results from last 7 days   Lab Units 25  1721   WBC Thousand/uL 4.99   HEMOGLOBIN g/dL 8.3*   HEMATOCRIT % 27.0*   PLATELETS Thousands/uL 217   SEGS PCT % 67   LYMPHO PCT % 25   MONO PCT % 7   EOS PCT % 1     Results from last 7 days   Lab Units 25  1721   SODIUM mmol/L 139   POTASSIUM mmol/L 4.3   CHLORIDE mmol/L 108   CO2 mmol/L 26   BUN mg/dL 21   CREATININE mg/dL 0.90   ANION GAP mmol/L 5   CALCIUM mg/dL 8.7   ALBUMIN g/dL 3.9   TOTAL BILIRUBIN mg/dL 0.34   ALK PHOS U/L 76   ALT U/L 17   AST U/L 18   GLUCOSE RANDOM mg/dL 86             Lab Results   Component Value Date    HGBA1C 5.1 2018           Other Study Results Review: EKG was reviewed.     Administrative Statements   I have spent a total time of 60 minutes in caring for this patient on the day of the visit/encounter including Risks and benefits of tx options, Instructions for management, Patient and family education, Importance of tx compliance, Risk factor reductions, Impressions, Counseling / Coordination of care, Documenting in the medical record, Reviewing / ordering tests, medicine, procedures  , Obtaining or reviewing history  , and Communicating with other healthcare professionals .    ** Please Note: This note has been constructed using a voice recognition system. **

## 2025-01-16 NOTE — ED PROVIDER NOTES
Time reflects when diagnosis was documented in both MDM as applicable and the Disposition within this note       Time User Action Codes Description Comment    1/16/2025  6:19 PM Martin Gautam Add [D64.9] Symptomatic anemia     1/16/2025  6:19 PM Martin Gautam Add [K92.2] Gastrointestinal hemorrhage, unspecified gastrointestinal hemorrhage type     1/17/2025  9:34 AM Constanza Langston Add [D62] Acute blood loss anemia     1/17/2025  3:31 PM Weiland, Kelly Modify [D64.9] Symptomatic anemia     1/17/2025  3:31 PM Weiland, Kelly Modify [K92.2] Gastrointestinal hemorrhage, unspecified gastrointestinal hemorrhage type     1/17/2025  3:31 PM Weiland, Kelly Modify [D62] Acute blood loss anemia           ED Disposition       ED Disposition   Admit    Condition   Stable    Date/Time   Thu Jan 16, 2025  6:19 PM    Comment   Case was discussed with Dr Wheat and the patient's admission status was agreed to be Admission Status: inpatient status to the service of Dr. Wheat .               Assessment & Plan       Medical Decision Making        Initial ED assessment:   59-year-old female, black stools earlier this month, outpatient blood work showing a drop of her hemoglobin from 15.7-8.8.  Feels weak tired dizzy, dyspnea on exertion still having dark stools but not black.  Stool is dark brown but guaiac positive    Pathology at risk for includes but is not limited to:   Symptomatic anemia, consider continuous GI bleed    Initial ED plan:     Recheck blood work, if blood count is decreasing, consider admission as she is having continuous bleeding and is already having symptomatic anemia, consider transfusion.  If blood counts are increasing, likely still has some residual bleeding but is compensating she has a scheduled colonoscopy next week will likely discharge.        Final ED summary/disposition:   After evaluation and workup in the emergency department, blood count 8.3 this is decreased from 8.8, still having active  bleeding will admit to internal medicine service.     Amount and/or Complexity of Data Reviewed  Labs: ordered. Decision-making details documented in ED Course.    Risk  Prescription drug management.  Decision regarding hospitalization.        ED Course as of 01/18/25 0439   Thu Jan 16, 2025   1802 Hemoglobin(!): 8.3  This is steadily decreasing.  Last blood value was from 3 days prior which was 8.8       Medications   pantoprazole (PROTONIX) injection 40 mg (40 mg Intravenous Given 1/16/25 1829)       ED Risk Strat Scores                          SBIRT 22yo+      Flowsheet Row Most Recent Value   Initial Alcohol Screen: US AUDIT-C     1. How often do you have a drink containing alcohol? 0 Filed at: 01/16/2025 1722   2. How many drinks containing alcohol do you have on a typical day you are drinking?  0 Filed at: 01/16/2025 1722   3b. FEMALE Any Age, or MALE 65+: How often do you have 4 or more drinks on one occassion? 0 Filed at: 01/16/2025 1722   Audit-C Score 0 Filed at: 01/16/2025 1722   FAY: How many times in the past year have you...    Used an illegal drug or used a prescription medication for non-medical reasons? Never Filed at: 01/16/2025 1722                            History of Present Illness       Chief Complaint   Patient presents with    Abnormal Lab     Had the gi bug beginning of the year then reported dark stools, hemoglobin was low. Went to gi, pt has colonoscopy scheduled for next week. Pt was told to come to ER by pcp today because pt states blood counts are still low and feeling tired.        Past Medical History:   Diagnosis Date    Endometrial cancer (HCC) 2011    Last assessed: 2/8/17; age 46    Sinusitis     Streptococcal pharyngitis       Past Surgical History:   Procedure Laterality Date    CATARACT EXTRACTION, BILATERAL      DIAGNOSTIC LAPAROSCOPY      HYSTERECTOMY      LAPAROSCOPIC TOTAL HYSTERECTOMY      MOLE REMOVAL  07/12/2019    TOTAL ABDOMINAL HYSTERECTOMY W/ BILATERAL  SALPINGOOPHORECTOMY Bilateral 2011    Last assessed: 2/8/17; age 46      Family History   Problem Relation Age of Onset    Hypertension Mother     Breast cancer Mother 49    Other Father         pace maker     Prostate cancer Father 65    Colon cancer Father 73    Anxiety disorder Sister     Depression Sister     No Known Problems Maternal Grandmother     No Known Problems Maternal Grandfather     No Known Problems Paternal Grandmother     No Known Problems Paternal Grandfather     No Known Problems Maternal Aunt     No Known Problems Maternal Aunt     No Known Problems Paternal Aunt       Social History     Tobacco Use    Smoking status: Never    Smokeless tobacco: Never    Tobacco comments:     No second hand smoke exposure   Vaping Use    Vaping status: Never Used   Substance Use Topics    Alcohol use: No    Drug use: No      E-Cigarette/Vaping    E-Cigarette Use Never User       E-Cigarette/Vaping Substances    Nicotine No     THC No     CBD No     Flavoring No     Other No     Unknown No       I have reviewed and agree with the history as documented.     59-year-old female, presents with black stools  earlier this month for 3 days.,  Since then has not been black but was having darker stools.,  Went for outpatient blood work few days prior which showed a hemoglobin of 8.8.,  Comparing this to older blood work from 2 months prior her hemoglobin at that time was  15.7.  Patient says she is feeling very short of breath with minimal exertion, tired, weak, feels and looks very pale.  Is having bowel movements but no blood no melena at this time          Review of Systems   Constitutional:  Negative for activity change, chills, diaphoresis and fever.   HENT:  Negative for congestion, sinus pressure and sore throat.    Eyes:  Negative for pain and visual disturbance.   Respiratory:  Negative for cough, chest tightness, shortness of breath, wheezing and stridor.    Cardiovascular:  Negative for chest pain and  palpitations.   Gastrointestinal:  Negative for abdominal distention, abdominal pain, constipation, diarrhea, nausea and vomiting.   Genitourinary:  Negative for dysuria and frequency.   Musculoskeletal:  Negative for neck pain and neck stiffness.   Skin:  Negative for rash.   Neurological:  Negative for dizziness, speech difficulty, light-headedness, numbness and headaches.           Objective       ED Triage Vitals   Temperature Pulse Blood Pressure Respirations SpO2 Patient Position - Orthostatic VS   01/16/25 1648 01/16/25 1648 01/16/25 1648 01/16/25 1648 01/16/25 1648 01/16/25 1648   98.6 °F (37 °C) 98 147/71 18 99 % Sitting      Temp Source Heart Rate Source BP Location FiO2 (%) Pain Score    01/16/25 1648 01/16/25 1648 01/16/25 1648 -- 01/16/25 1929    Oral Monitor Right arm  No Pain      Vitals      Date and Time Temp Pulse SpO2 Resp BP Pain Score FACES Pain Rating User   01/17/25 2337 -- 87 94 % -- 119/68 -- -- DII   01/17/25 2100 -- -- -- -- -- No Pain -- BJ   01/17/25 1916 98 °F (36.7 °C) 85 96 % 16 119/69 -- -- DII   01/17/25 1609 -- 74 -- 18 113/58 No Pain -- KM   01/17/25 1545 -- 74 97 % 17 107/61 No Pain -- OO   01/17/25 1536 98.2 °F (36.8 °C) 74 97 % 15 99/56 -- -- BK   01/17/25 1328 -- 74 95 % 18 110/59 -- -- CF   01/17/25 1200 -- -- -- -- -- 4 -- KM   01/17/25 0700 98.1 °F (36.7 °C) 75 97 % 18 137/68 -- -- AL   01/17/25 0539 -- -- -- -- -- 3 -- EW   01/17/25 0140 -- -- -- -- -- No Pain -- EW   01/16/25 2228 98.2 °F (36.8 °C) 80 99 % 17 120/56 No Pain -- OM   01/16/25 2048 -- -- -- -- -- No Pain -- CLC   01/16/25 1957 -- 77 98 % 17 -- No Pain -- OM   01/16/25 1957 98.5 °F (36.9 °C) -- -- -- 133/61 -- -- CS   01/16/25 1947 98.3 °F (36.8 °C) 73 99 % 16 153/69 -- -- CS   01/16/25 1937 98.3 °F (36.8 °C) 72 -- 16 148/70 -- -- CS   01/16/25 1929 98.1 °F (36.7 °C) 74 99 % 16 153/68 No Pain -- CS   01/16/25 1648 98.6 °F (37 °C) 98 99 % 18 147/71 -- -- DW            Physical Exam  Vitals reviewed.    Constitutional:       General: She is not in acute distress.     Appearance: She is well-developed. She is not diaphoretic.   HENT:      Head: Normocephalic and atraumatic.      Right Ear: External ear normal.      Left Ear: External ear normal.      Nose: Nose normal.   Eyes:      General:         Right eye: No discharge.         Left eye: No discharge.      Pupils: Pupils are equal, round, and reactive to light.   Neck:      Trachea: No tracheal deviation.   Cardiovascular:      Rate and Rhythm: Normal rate and regular rhythm.      Heart sounds: Normal heart sounds. No murmur heard.  Pulmonary:      Effort: Pulmonary effort is normal. No respiratory distress.      Breath sounds: Normal breath sounds. No stridor.   Abdominal:      General: There is no distension.      Palpations: Abdomen is soft.      Tenderness: There is no abdominal tenderness. There is no guarding or rebound.   Genitourinary:     Rectum: Guaiac result positive.   Musculoskeletal:         General: Normal range of motion.      Cervical back: Normal range of motion and neck supple.   Skin:     General: Skin is warm and dry.      Coloration: Skin is pale.      Findings: No erythema.   Neurological:      General: No focal deficit present.      Mental Status: She is alert and oriented to person, place, and time.         Results Reviewed       Procedure Component Value Units Date/Time    Ferritin [[id number]]  (Abnormal) Collected: 01/16/25 1721    Lab Status: Final result Specimen: Blood from Arm, Right Updated: 01/17/25 0640     Ferritin 5 ng/mL     TIBC Panel (incl. Iron, TIBC, % Iron Saturation) [[id number]]  (Abnormal) Collected: 01/16/25 1721    Lab Status: Final result Specimen: Blood from Arm, Right Updated: 01/17/25 0632     Iron Saturation 4 %      TIBC 336 ug/dL      Iron 12 ug/dL      Transferrin 240 mg/dL      UIBC 324 ug/dL     Vitamin B12 [[id number]]  (Normal) Collected: 01/16/25 1721    Lab Status: Final result Specimen: Blood from Arm,  Right Updated: 01/17/25 0632     Vitamin B-12 506 pg/mL     Retic Count [[id number]]  (Abnormal) Collected: 01/16/25 1721    Lab Status: Final result Specimen: Blood from Arm, Right Updated: 01/16/25 2206     Retic Ct Abs 83,000     Retic Ct Pct 2.86 %     Comprehensive metabolic panel [[id number]]  (Abnormal) Collected: 01/16/25 1721    Lab Status: Final result Specimen: Blood from Arm, Right Updated: 01/16/25 1748     Sodium 139 mmol/L      Potassium 4.3 mmol/L      Chloride 108 mmol/L      CO2 26 mmol/L      ANION GAP 5 mmol/L      BUN 21 mg/dL      Creatinine 0.90 mg/dL      Glucose 86 mg/dL      Calcium 8.7 mg/dL      AST 18 U/L      ALT 17 U/L      Alkaline Phosphatase 76 U/L      Total Protein 6.0 g/dL      Albumin 3.9 g/dL      Total Bilirubin 0.34 mg/dL      eGFR 69 ml/min/1.73sq m     Narrative:      [employer] guidelines for Chronic Kidney Disease (CKD):     Stage 1 with normal or high GFR (GFR > 90 mL/min/1.73 square meters)    Stage 2 Mild CKD (GFR = 60-89 mL/min/1.73 square meters)    Stage 3A Moderate CKD (GFR = 45-59 mL/min/1.73 square meters)    Stage 3B Moderate CKD (GFR = 30-44 mL/min/1.73 square meters)    Stage 4 Severe CKD (GFR = 15-29 mL/min/1.73 square meters)    Stage 5 End Stage CKD (GFR <15 mL/min/1.73 square meters)  Note: GFR calculation is accurate only with a steady state creatinine    CBC and differential [[id number]]  (Abnormal) Collected: 01/16/25 1721    Lab Status: Final result Specimen: Blood from Arm, Right Updated: 01/16/25 1737     WBC 4.99 Thousand/uL      RBC 2.88 Million/uL      Hemoglobin 8.3 g/dL      Hematocrit 27.0 %      MCV 94 fL      MCH 28.8 pg      MCHC 30.7 g/dL      RDW 13.8 %      MPV 9.9 fL      Platelets 217 Thousands/uL      nRBC 0 /100 WBCs      Segmented % 67 %      Immature Grans % 0 %      Lymphocytes % 25 %      Monocytes % 7 %      Eosinophils Relative 1 %      Basophils Relative 0 %      Absolute Neutrophils 3.31 Thousands/µL       Absolute Immature Grans 0.02 Thousand/uL      Absolute Lymphocytes 1.24 Thousands/µL      Absolute Monocytes 0.37 Thousand/µL      Eosinophils Absolute 0.03 Thousand/µL      Basophils Absolute 0.02 Thousands/µL             No orders to display       ECG 12 Lead Documentation Only    Date/Time: 1/16/2025 6:06 PM    Performed by: Martin Gautam DO  Authorized by: Martin Gautam DO    ECG reviewed by me, the ED Provider: yes    Patient location:  ED  Interpretation:     Interpretation: normal    Rate:     ECG rate:  77    ECG rate assessment: normal    Rhythm:     Rhythm: sinus rhythm    Ectopy:     Ectopy: none    QRS:     QRS axis:  Normal    QRS intervals:  Normal  Conduction:     Conduction: normal    ST segments:     ST segments:  Normal  T waves:     T waves: normal        ED Medication and Procedure Management   Prior to Admission Medications   Prescriptions Last Dose Informant Patient Reported? Taking?   ALPHAGAN P 0.1 %  Self Yes No   Sig: INSERT 1 DROPS OPHTHALMICALLY EVERY 12 HOURS INTO BOTH EYES   ALPRAZolam (XANAX) 0.25 mg tablet 1/15/2025 Self No Yes   Sig: TAKE 1 TABLET (0.25 MG TOTAL) BY MOUTH 2 (TWO) TIMES A DAY AS NEEDED FOR ANXIETY   ALPRAZolam (XANAX) 0.5 mg tablet  Self Yes No   Sig: Take 0.5 mg by mouth daily as needed   DULoxetine (CYMBALTA) 30 mg delayed release capsule 1/15/2025 Self Yes Yes   Sig: Take 90 mg by mouth daily   Galcanezumab-gnlm 120 MG/ML SOAJ   No No   Sig: Inject 120 mg under the skin every 30 (thirty) days Starting second month   L-Methylfolate-Algae (DEPLIN 15) 15-90.314 MG CAPS  Self Yes No   Sig: Take 1 capsule by mouth daily   Multiple Vitamins-Minerals (EYE SUPPORT PO) Not Taking Self Yes No   Sig: Take 1 tablet by mouth daily    Patient not taking: Reported on 1/16/2025   Multiple Vitamins-Minerals (VISION FORMULA 2 PO) Not Taking  Yes No   Sig: Take by mouth daily VISION FORMULA EYE HEALTH ORAL CAPSULE   Patient not taking: Reported on 1/16/2025   OLANZapine  (ZyPREXA) 5 mg tablet Unknown Self No No   Sig: Take 1 tablet (5 mg total) by mouth daily at bedtime   Patient not taking: Reported on 1/16/2025   OnabotulinumtoxinA (BOTOX IJ)  Self Yes No   Sig: Inject as directed   Travoprost (TRAVATAN OP)   Yes No   Sig: Administer 1 drop to both eyes daily   Ubrelvy 100 MG tablet 1/15/2025  No Yes   Sig: TAKE 1 TABLET BY MOUTH AS NEEDED (MIGRAINE) MAY REPEAT IN 2 HOURS IF NEEDED. MAX 2 IN 24 HOURS   Ubrelvy 50 MG tablet   No No   Sig: TAKE AT TABLET AT ONSET OF MIGRAINE, MAY REPEAT IN 2 HOURS IF NEEDED.   Patient taking differently: Take 50 mg by mouth daily as needed   acetaminophen (TYLENOL) 500 mg tablet  Self Yes No   Sig: Take 1,000 mg by mouth as needed for mild pain (3-4 times a week)    cloNIDine (CATAPRES) 0.2 mg tablet 1/15/2025 Self Yes Yes   Sig: Take 0.2 mg by mouth daily    clonazePAM (KlonoPIN) 0.5 mg tablet  Self Yes No   Sig: Take 0.5 mg by mouth daily    cyproheptadine (PERIACTIN) 4 mg tablet   No No   Sig: TAKE 1 TABLET (4 MG TOTAL) BY MOUTH DAILY AT BEDTIME AS NEEDED FOR ALLERGIES (HEADACHES)   dexamethasone (DECADRON) 1 mg tablet Not Taking Self No No   Sig: Take 1 tablet (1 mg total) by mouth daily with breakfast   Patient not taking: Reported on 1/16/2025   dicyclomine (BENTYL) 10 mg capsule Past Month  Yes Yes   Sig: Take 10 mg by mouth Three times daily as needed   estradiol (ESTRACE) 0.1 mg/g vaginal cream More than a month Self Yes No   Sig: Insert 2 g into the vagina as needed    eszopiclone (LUNESTA) 3 MG tablet 1/15/2025 Self Yes Yes   Sig: Take 3 mg by mouth daily   hydrOXYzine HCL (ATARAX) 50 mg tablet 1/15/2025 Self Yes Yes   Sig: Take 50 mg by mouth daily at bedtime Alternates with the 25 mg.   ibuprofen (MOTRIN) 200 mg tablet Past Month Self Yes Yes   Sig: Take 400 mg by mouth as needed for mild pain   lidocaine (XYLOCAINE) 5 % ointment  Self Yes No   Sig: Apply 1 application topically as needed    meloxicam (MOBIC) 15 mg tablet Past Month   Yes Yes   Sig: Take 15 mg by mouth daily   methocarbamol (ROBAXIN) 750 mg tablet Past Month  Yes Yes   Sig: Take 750 mg by mouth 4 (four) times a day as needed   prochlorperazine (COMPAZINE) 10 mg tablet Not Taking Self No No   Sig: Take 1 tablet (10 mg total) by mouth every 8 (eight) hours as needed (migraine)   Patient not taking: Reported on 2025   tolnaftate (TINACTIN) 1 % external solution   Yes No   Si mL   triamcinolone (KENALOG) 0.1 % cream  Self Yes No   Sig: APPLY TO SKIN TWICE DAILY X 1-2 WEEKS THEN AS NEEDED FOR RASH   valACYclovir (VALTREX) 500 mg tablet 1/15/2025 Self Yes Yes   Sig: Take 1 tablet by mouth daily      Facility-Administered Medications: None     Current Discharge Medication List        CONTINUE these medications which have NOT CHANGED    Details   !! ALPRAZolam (XANAX) 0.25 mg tablet TAKE 1 TABLET (0.25 MG TOTAL) BY MOUTH 2 (TWO) TIMES A DAY AS NEEDED FOR ANXIETY  Qty: 30 tablet, Refills: 0    Comments: This request is for a new prescription for a controlled substance as required by Federal/State law.PATIENT NEEDS REFILLS.  Associated Diagnoses: Anxiety and depression      cloNIDine (CATAPRES) 0.2 mg tablet Take 0.2 mg by mouth daily   Refills: 0      dicyclomine (BENTYL) 10 mg capsule Take 10 mg by mouth Three times daily as needed      DULoxetine (CYMBALTA) 30 mg delayed release capsule Take 90 mg by mouth daily  Refills: 0      eszopiclone (LUNESTA) 3 MG tablet Take 3 mg by mouth daily  Refills: 0      hydrOXYzine HCL (ATARAX) 50 mg tablet Take 50 mg by mouth daily at bedtime Alternates with the 25 mg.  Refills: 0      ibuprofen (MOTRIN) 200 mg tablet Take 400 mg by mouth as needed for mild pain      meloxicam (MOBIC) 15 mg tablet Take 15 mg by mouth daily      methocarbamol (ROBAXIN) 750 mg tablet Take 750 mg by mouth 4 (four) times a day as needed      !! Ubrelvy 100 MG tablet TAKE 1 TABLET BY MOUTH AS NEEDED (MIGRAINE) MAY REPEAT IN 2 HOURS IF NEEDED. MAX 2 IN 24  HOURS  Qty: 10 tablet, Refills: 3    Associated Diagnoses: Chronic migraine without aura without status migrainosus, not intractable      valACYclovir (VALTREX) 500 mg tablet Take 1 tablet by mouth daily      acetaminophen (TYLENOL) 500 mg tablet Take 1,000 mg by mouth as needed for mild pain (3-4 times a week)       ALPHAGAN P 0.1 % INSERT 1 DROPS OPHTHALMICALLY EVERY 12 HOURS INTO BOTH EYES  Refills: 1      !! ALPRAZolam (XANAX) 0.5 mg tablet Take 0.5 mg by mouth daily as needed      clonazePAM (KlonoPIN) 0.5 mg tablet Take 0.5 mg by mouth daily       cyproheptadine (PERIACTIN) 4 mg tablet TAKE 1 TABLET (4 MG TOTAL) BY MOUTH DAILY AT BEDTIME AS NEEDED FOR ALLERGIES (HEADACHES)  Qty: 90 tablet, Refills: 1    Associated Diagnoses: Chronic migraine without aura without status migrainosus, not intractable      dexamethasone (DECADRON) 1 mg tablet Take 1 tablet (1 mg total) by mouth daily with breakfast  Qty: 5 tablet, Refills: 0    Associated Diagnoses: Chronic migraine without aura without status migrainosus, not intractable      estradiol (ESTRACE) 0.1 mg/g vaginal cream Insert 2 g into the vagina as needed   Refills: 3      Galcanezumab-gnlm 120 MG/ML SOAJ Inject 120 mg under the skin every 30 (thirty) days Starting second month  Qty: 1 mL, Refills: 11    Associated Diagnoses: Chronic migraine without aura without status migrainosus, not intractable      L-Methylfolate-Algae (DEPLIN 15) 15-90.314 MG CAPS Take 1 capsule by mouth daily  Refills: 0      lidocaine (XYLOCAINE) 5 % ointment Apply 1 application topically as needed       Multiple Vitamins-Minerals (EYE SUPPORT PO) Take 1 tablet by mouth daily       Multiple Vitamins-Minerals (VISION FORMULA 2 PO) Take by mouth daily VISION FORMULA EYE HEALTH ORAL CAPSULE      OLANZapine (ZyPREXA) 5 mg tablet Take 1 tablet (5 mg total) by mouth daily at bedtime  Qty: 5 tablet, Refills: 0    Associated Diagnoses: Chronic migraine without aura without status migrainosus, not  intractable      OnabotulinumtoxinA (BOTOX IJ) Inject as directed      prochlorperazine (COMPAZINE) 10 mg tablet Take 1 tablet (10 mg total) by mouth every 8 (eight) hours as needed (migraine)  Qty: 10 tablet, Refills: 0    Associated Diagnoses: Chronic migraine without aura without status migrainosus, not intractable      tolnaftate (TINACTIN) 1 % external solution 1 mL      Travoprost (TRAVATAN OP) Administer 1 drop to both eyes daily      triamcinolone (KENALOG) 0.1 % cream APPLY TO SKIN TWICE DAILY X 1-2 WEEKS THEN AS NEEDED FOR RASH  Refills: 1      !! Ubrelvy 50 MG tablet TAKE AT TABLET AT ONSET OF MIGRAINE, MAY REPEAT IN 2 HOURS IF NEEDED.  Qty: 10 tablet, Refills: 0    Associated Diagnoses: Chronic migraine without aura without status migrainosus, not intractable       !! - Potential duplicate medications found. Please discuss with provider.        No discharge procedures on file.  ED SEPSIS DOCUMENTATION   Time reflects when diagnosis was documented in both MDM as applicable and the Disposition within this note       Time User Action Codes Description Comment    1/16/2025  6:19 PM Martin Gautam Add [D64.9] Symptomatic anemia     1/16/2025  6:19 PM Martin Gautam Add [K92.2] Gastrointestinal hemorrhage, unspecified gastrointestinal hemorrhage type     1/17/2025  9:34 AM Constanza Langston Add [D62] Acute blood loss anemia     1/17/2025  3:31 PM Weiland, Kelly Modify [D64.9] Symptomatic anemia     1/17/2025  3:31 PM Weiland, Kelly Modify [K92.2] Gastrointestinal hemorrhage, unspecified gastrointestinal hemorrhage type     1/17/2025  3:31 PM Weiland, Kelly Modify [D62] Acute blood loss anemia                  Martin Gautam, DO  01/18/25 0439

## 2025-01-17 ENCOUNTER — ANESTHESIA (INPATIENT)
Dept: GASTROENTEROLOGY | Facility: HOSPITAL | Age: 60
DRG: 811 | End: 2025-01-17
Payer: COMMERCIAL

## 2025-01-17 ENCOUNTER — ANESTHESIA EVENT (INPATIENT)
Dept: GASTROENTEROLOGY | Facility: HOSPITAL | Age: 60
DRG: 811 | End: 2025-01-17
Payer: COMMERCIAL

## 2025-01-17 ENCOUNTER — APPOINTMENT (INPATIENT)
Dept: GASTROENTEROLOGY | Facility: HOSPITAL | Age: 60
DRG: 811 | End: 2025-01-17
Payer: COMMERCIAL

## 2025-01-17 PROBLEM — D62 ACUTE BLOOD LOSS ANEMIA: Status: ACTIVE | Noted: 2025-01-16

## 2025-01-17 LAB
ABO GROUP BLD BPU: NORMAL
ANION GAP SERPL CALCULATED.3IONS-SCNC: 5 MMOL/L (ref 4–13)
ATRIAL RATE: 77 BPM
BPU ID: NORMAL
BUN SERPL-MCNC: 16 MG/DL (ref 5–25)
CALCIUM SERPL-MCNC: 8.2 MG/DL (ref 8.4–10.2)
CHLORIDE SERPL-SCNC: 111 MMOL/L (ref 96–108)
CO2 SERPL-SCNC: 24 MMOL/L (ref 21–32)
CREAT SERPL-MCNC: 0.73 MG/DL (ref 0.6–1.3)
CROSSMATCH: NORMAL
FERRITIN SERPL-MCNC: 5 NG/ML (ref 11–307)
FOLATE SERPL-MCNC: >22.3 NG/ML
GFR SERPL CREATININE-BSD FRML MDRD: 89 ML/MIN/1.73SQ M
GLUCOSE SERPL-MCNC: 78 MG/DL (ref 65–140)
IRON SATN MFR SERPL: 4 % (ref 15–50)
IRON SERPL-MCNC: 12 UG/DL (ref 50–212)
P AXIS: 83 DEGREES
POTASSIUM SERPL-SCNC: 4.1 MMOL/L (ref 3.5–5.3)
PR INTERVAL: 144 MS
QRS AXIS: 79 DEGREES
QRSD INTERVAL: 72 MS
QT INTERVAL: 362 MS
QTC INTERVAL: 409 MS
SODIUM SERPL-SCNC: 140 MMOL/L (ref 135–147)
T WAVE AXIS: 77 DEGREES
TIBC SERPL-MCNC: 336 UG/DL (ref 250–450)
TRANSFERRIN SERPL-MCNC: 240 MG/DL (ref 203–362)
TSH SERPL DL<=0.05 MIU/L-ACNC: 1.9 UIU/ML (ref 0.45–4.5)
UIBC SERPL-MCNC: 324 UG/DL (ref 155–355)
UNIT DISPENSE STATUS: NORMAL
UNIT PRODUCT CODE: NORMAL
UNIT PRODUCT VOLUME: 350 ML
UNIT RH: NORMAL
VENTRICULAR RATE: 77 BPM
VIT B12 SERPL-MCNC: 506 PG/ML (ref 180–914)

## 2025-01-17 PROCEDURE — 99222 1ST HOSP IP/OBS MODERATE 55: CPT | Performed by: INTERNAL MEDICINE

## 2025-01-17 PROCEDURE — 0DB68ZX EXCISION OF STOMACH, VIA NATURAL OR ARTIFICIAL OPENING ENDOSCOPIC, DIAGNOSTIC: ICD-10-PCS | Performed by: INTERNAL MEDICINE

## 2025-01-17 PROCEDURE — 82746 ASSAY OF FOLIC ACID SERUM: CPT

## 2025-01-17 PROCEDURE — 88341 IMHCHEM/IMCYTCHM EA ADD ANTB: CPT | Performed by: STUDENT IN AN ORGANIZED HEALTH CARE EDUCATION/TRAINING PROGRAM

## 2025-01-17 PROCEDURE — 84443 ASSAY THYROID STIM HORMONE: CPT

## 2025-01-17 PROCEDURE — 80048 BASIC METABOLIC PNL TOTAL CA: CPT

## 2025-01-17 PROCEDURE — 43239 EGD BIOPSY SINGLE/MULTIPLE: CPT | Performed by: INTERNAL MEDICINE

## 2025-01-17 PROCEDURE — 88342 IMHCHEM/IMCYTCHM 1ST ANTB: CPT | Performed by: STUDENT IN AN ORGANIZED HEALTH CARE EDUCATION/TRAINING PROGRAM

## 2025-01-17 PROCEDURE — 88305 TISSUE EXAM BY PATHOLOGIST: CPT | Performed by: STUDENT IN AN ORGANIZED HEALTH CARE EDUCATION/TRAINING PROGRAM

## 2025-01-17 PROCEDURE — 93010 ELECTROCARDIOGRAM REPORT: CPT | Performed by: INTERNAL MEDICINE

## 2025-01-17 PROCEDURE — 99232 SBSQ HOSP IP/OBS MODERATE 35: CPT | Performed by: INTERNAL MEDICINE

## 2025-01-17 RX ORDER — SODIUM CHLORIDE, SODIUM LACTATE, POTASSIUM CHLORIDE, CALCIUM CHLORIDE 600; 310; 30; 20 MG/100ML; MG/100ML; MG/100ML; MG/100ML
125 INJECTION, SOLUTION INTRAVENOUS CONTINUOUS
Status: DISCONTINUED | OUTPATIENT
Start: 2025-01-17 | End: 2025-01-18 | Stop reason: HOSPADM

## 2025-01-17 RX ORDER — LIDOCAINE HYDROCHLORIDE 10 MG/ML
INJECTION, SOLUTION EPIDURAL; INFILTRATION; INTRACAUDAL; PERINEURAL AS NEEDED
Status: DISCONTINUED | OUTPATIENT
Start: 2025-01-17 | End: 2025-01-17

## 2025-01-17 RX ORDER — PROPOFOL 10 MG/ML
INJECTION, EMULSION INTRAVENOUS AS NEEDED
Status: DISCONTINUED | OUTPATIENT
Start: 2025-01-17 | End: 2025-01-17

## 2025-01-17 RX ORDER — METOCLOPRAMIDE HYDROCHLORIDE 5 MG/ML
10 INJECTION INTRAMUSCULAR; INTRAVENOUS ONCE
Status: COMPLETED | OUTPATIENT
Start: 2025-01-17 | End: 2025-01-17

## 2025-01-17 RX ORDER — MAGNESIUM SULFATE HEPTAHYDRATE 40 MG/ML
2 INJECTION, SOLUTION INTRAVENOUS ONCE
Status: COMPLETED | OUTPATIENT
Start: 2025-01-17 | End: 2025-01-17

## 2025-01-17 RX ORDER — DIPHENHYDRAMINE HYDROCHLORIDE 50 MG/ML
25 INJECTION INTRAMUSCULAR; INTRAVENOUS ONCE
Status: COMPLETED | OUTPATIENT
Start: 2025-01-17 | End: 2025-01-17

## 2025-01-17 RX ORDER — SODIUM CHLORIDE, SODIUM LACTATE, POTASSIUM CHLORIDE, CALCIUM CHLORIDE 600; 310; 30; 20 MG/100ML; MG/100ML; MG/100ML; MG/100ML
INJECTION, SOLUTION INTRAVENOUS CONTINUOUS PRN
Status: DISCONTINUED | OUTPATIENT
Start: 2025-01-17 | End: 2025-01-17

## 2025-01-17 RX ORDER — SODIUM CHLORIDE 9 MG/ML
100 INJECTION, SOLUTION INTRAVENOUS ONCE
Status: COMPLETED | OUTPATIENT
Start: 2025-01-17 | End: 2025-01-17

## 2025-01-17 RX ORDER — ACETAMINOPHEN 325 MG/1
650 TABLET ORAL EVERY 6 HOURS PRN
Status: DISCONTINUED | OUTPATIENT
Start: 2025-01-17 | End: 2025-01-18 | Stop reason: HOSPADM

## 2025-01-17 RX ORDER — SUMATRIPTAN 6 MG/.5ML
6 INJECTION, SOLUTION SUBCUTANEOUS ONCE
Status: COMPLETED | OUTPATIENT
Start: 2025-01-17 | End: 2025-01-17

## 2025-01-17 RX ADMIN — PANTOPRAZOLE SODIUM 40 MG: 40 INJECTION, POWDER, FOR SOLUTION INTRAVENOUS at 08:05

## 2025-01-17 RX ADMIN — LIDOCAINE HYDROCHLORIDE 50 MG: 10 INJECTION, SOLUTION EPIDURAL; INFILTRATION; INTRACAUDAL at 15:26

## 2025-01-17 RX ADMIN — METOCLOPRAMIDE 10 MG: 5 INJECTION, SOLUTION INTRAMUSCULAR; INTRAVENOUS at 12:32

## 2025-01-17 RX ADMIN — VALACYCLOVIR 500 MG: 500 TABLET, FILM COATED ORAL at 08:04

## 2025-01-17 RX ADMIN — DULOXETINE HYDROCHLORIDE 90 MG: 30 CAPSULE, DELAYED RELEASE ORAL at 08:05

## 2025-01-17 RX ADMIN — SODIUM CHLORIDE, SODIUM LACTATE, POTASSIUM CHLORIDE, AND CALCIUM CHLORIDE: .6; .31; .03; .02 INJECTION, SOLUTION INTRAVENOUS at 14:28

## 2025-01-17 RX ADMIN — CLONIDINE HYDROCHLORIDE 0.2 MG: 0.1 TABLET ORAL at 08:05

## 2025-01-17 RX ADMIN — ACETAMINOPHEN 650 MG: 325 TABLET, FILM COATED ORAL at 05:39

## 2025-01-17 RX ADMIN — PANTOPRAZOLE SODIUM 40 MG: 40 INJECTION, POWDER, FOR SOLUTION INTRAVENOUS at 20:23

## 2025-01-17 RX ADMIN — SODIUM CHLORIDE 100 ML/HR: 0.9 INJECTION, SOLUTION INTRAVENOUS at 08:06

## 2025-01-17 RX ADMIN — DIPHENHYDRAMINE HYDROCHLORIDE 25 MG: 50 INJECTION, SOLUTION INTRAMUSCULAR; INTRAVENOUS at 12:32

## 2025-01-17 RX ADMIN — SODIUM CHLORIDE, SODIUM LACTATE, POTASSIUM CHLORIDE, AND CALCIUM CHLORIDE 125 ML/HR: .6; .31; .03; .02 INJECTION, SOLUTION INTRAVENOUS at 17:16

## 2025-01-17 RX ADMIN — PROPOFOL 50 MG: 10 INJECTION, EMULSION INTRAVENOUS at 15:30

## 2025-01-17 RX ADMIN — PROPOFOL 30 MG: 10 INJECTION, EMULSION INTRAVENOUS at 15:27

## 2025-01-17 RX ADMIN — PROPOFOL 120 MG: 10 INJECTION, EMULSION INTRAVENOUS at 15:26

## 2025-01-17 RX ADMIN — MULTIPLE VITAMINS W/ MINERALS TAB 1 TABLET: TAB ORAL at 08:05

## 2025-01-17 RX ADMIN — SODIUM CHLORIDE 100 ML/HR: 0.9 INJECTION, SOLUTION INTRAVENOUS at 12:32

## 2025-01-17 RX ADMIN — IRON SUCROSE 300 MG: 20 INJECTION, SOLUTION INTRAVENOUS at 08:12

## 2025-01-17 RX ADMIN — SUMATRIPTAN 6 MG: 6 INJECTION, SOLUTION SUBCUTANEOUS at 20:24

## 2025-01-17 RX ADMIN — HYDROXYZINE HYDROCHLORIDE 50 MG: 25 TABLET ORAL at 21:55

## 2025-01-17 RX ADMIN — MAGNESIUM SULFATE IN WATER FOR 2 G: 40 INJECTION INTRAVENOUS at 12:33

## 2025-01-17 NOTE — UTILIZATION REVIEW
Initial Clinical Review    Admission: Date/Time/Statement:   Admission Orders (From admission, onward)       Ordered        01/16/25 1819  INPATIENT ADMISSION  Once                          Orders Placed This Encounter   Procedures    INPATIENT ADMISSION     Standing Status:   Standing     Number of Occurrences:   1     Level of Care:   Med Surg [16]     Estimated length of stay:   More than 2 Midnights     Certification:   I certify that inpatient services are medically necessary for this patient for a duration of greater than two midnights. See H&P and MD Progress Notes for additional information about the patient's course of treatment.     ED Arrival Information       Expected   -    Arrival   1/16/2025 16:43    Acuity   Urgent              Means of arrival   Walk-In    Escorted by   Self    Service   Hospitalist    Admission type   Emergency              Arrival complaint   abnormal labs             Chief Complaint   Patient presents with    Abnormal Lab     Had the gi bug beginning of the year then reported dark stools, hemoglobin was low. Went to gi, pt has colonoscopy scheduled for next week. Pt was told to come to ER by pcp today because pt states blood counts are still low and feeling tired.        Initial Presentation: 59 y.o. female with hx migraines  , endometrial cancer s/p KITA/BSO  who presents to ED from home with abnormal outpt labs showing Hgb drop to 8.8 from baseline 14 . Pt reports VELÁZQUEZ, generalized weakness. Pt reports viral illness over the past month and during that time endorsed dark tarry stools which have since resolved.  Pt frequently uses NSAIDs for migraines and musculoskeletal pain, as well as use of dexamethasone last month for an illness. On exam, nader dark brown, Guiac +  . Pitting edema +1 BLE . Abdomen  non tender. Labs Hgb 8.3 . ECG WNL .  Pt given 1 U PRBC and IV PPI in ED. Admitted as Inpatient with anemia , GERD w/ esophagitis. PLan- GI consult . NPO after MN . IV PPI BID . IV  iron infusion MWF . CBC in am . F/U iron studies, retic count, B12, folate .  Anticipated Length of Stay/Certification Statement: Patient will be admitted on an inpatient basis with an anticipated length of stay of greater than 2 midnights secondary to symptomatic anemia .      Date: 1/17    Day 2:    Pt states she feels okay and is agreeable to undergoing EGD today if GI sees fit. She denies any worsening shortness of breath, stating her dyspnea has improved due to lack of activity. Denies any chest pain, palpitations, and headaches. She denies having a bowel movement yet today  Pt has iron count of 12 (down from 105 in November), iron saturation of 4 (down from 21 in Nov), and ferritin of 5 (down from 12). Continue IV Sublette infusions . Labs for  folate, B12 ordered to r/o combined anemia . B12 WNL . Folate pending . No CBC drawn this am  . CBC in am . Pt c/o 3/10 HA with hx migraines. Ordered modified migraine cocktail (NS, Magnesium, Reglan) and avoid bright lights/sounds.    GI consult -Abdomen exam benign no abdominal tenderness or guarding.  Patient reports frequent use of Excedrin for migraines for her headaches. Medical records show hemoglobin 15.7 on 11/7/2024.  Baseline hemoglobin 14-15.  Lab work on admission showed hemoglobin 8.3.  Keep NPO for EGD today. Monitor for signs GI bleed. Monitor Hgb .  Continue pantoprazole . Patient reports she is scheduled for outpatient colonoscopy on Wednesday    Pt s/p EDG today  that showed a couple of small fresh ulcers in the antrum measuring about 5 mm along with few other erosions and patchy inflammation. Gastric body biopsies done to check for H. pylori. PPI daily. Schedule repeat EGD, due: 4/17/2025     ED Treatment-Medication Administration from 01/16/2025 1643 to 01/16/2025 2017         Date/Time Order Dose Route Action     01/16/2025 1821 pantoprazole (PROTONIX) injection 40 mg 40 mg Intravenous Given            Scheduled Medications:  cloNIDine, 0.2 mg, Oral,  Daily  DULoxetine, 90 mg, Oral, Daily  eszopiclone, 3 mg, Oral, HS  hydrOXYzine HCL, 50 mg, Oral, HS  iron sucrose, 300 mg, Intravenous, Once per day on Monday Wednesday Friday  multivitamin-minerals, 1 tablet, Oral, Daily  pantoprazole, 40 mg, Intravenous, Q12H GUNJAN  valACYclovir, 500 mg, Oral, Daily    diphenhydrAMINE (BENADRYL) injection 25 mg  Dose: 25 mg  Freq: Once Route: IV  Start: 01/17/25 1300 End: 01/17/25 1232  magnesium sulfate 2 g/50 mL IVPB (premix) 2 g  Dose: 2 g  Freq: Once Route: IV  Last Dose: 2 g (01/17/25 1233)  Start: 01/17/25 1300 End: 01/17/25 1333  metoclopramide (REGLAN) injection 10 mg  Dose: 10 mg  Freq: Once Route: IV  Start: 01/17/25 1300 End: 01/17/25 1232  sodium chloride 0.9 % infusion  Dose: 100 mL/hr  Freq: Once Route: IV  Indications of Use: IV Hydration  Last Dose: 100 mL/hr (01/17/25 1232)  Start: 01/17/25 1300 End: 01/17/25 1232  SUMAtriptan (IMITREX) subcutaneous injection 6 mg  Dose: 6 mg  Freq: Once Route: SC  Indications of Use: MIGRAINE  Start: 01/17/25 1300    Continuous IV Infusions:  sodium chloride, 100 mL/hr, Intravenous, Continuous  end: 01/17/25 1208       PRN Meds:  acetaminophen, 650 mg, Oral, Q6H PRN x1 1/17   dicyclomine, 10 mg, Oral, TID PRN      ED Triage Vitals   Temperature Pulse Respirations Blood Pressure SpO2 Pain Score   01/16/25 1648 01/16/25 1648 01/16/25 1648 01/16/25 1648 01/16/25 1648 01/16/25 1929   98.6 °F (37 °C) 98 18 147/71 99 % No Pain     Weight (last 2 days)       None            Vital Signs (last 3 days)       Date/Time Temp Pulse Resp BP MAP (mmHg) SpO2 O2 Device Patient Position - Orthostatic VS Pain    01/17/25 1536 98.2 °F (36.8 °C) 74 15 99/56 -- 97 % None (Room air) -- --    01/17/25 1328 -- 74 18 110/59 -- 95 % None (Room air) -- --    01/17/25 1200 -- -- -- -- -- -- None (Room air) -- 4    01/17/25 0700 98.1 °F (36.7 °C) 75 18 137/68 95 97 % None (Room air) Lying --    01/17/25 0539 -- -- -- -- -- -- -- -- 3    01/17/25 0140 -- -- --  -- -- -- -- -- No Pain    01/16/25 2228 98.2 °F (36.8 °C) 80 17 120/56 80 99 % None (Room air) Lying No Pain    01/16/25 2048 -- -- -- -- -- -- -- -- No Pain    01/16/25 1957 98.5 °F (36.9 °C) 77 17 133/61 88 98 % None (Room air) Sitting No Pain    01/16/25 1947 98.3 °F (36.8 °C) 73 16 153/69 -- 99 % -- -- --    01/16/25 1937 98.3 °F (36.8 °C) 72 16 148/70 -- -- -- -- --    01/16/25 1929 98.1 °F (36.7 °C) 74 16 153/68 -- 99 % None (Room air) -- No Pain    01/16/25 1906 -- -- -- -- -- -- None (Room air) -- --    01/16/25 1648 98.6 °F (37 °C) 98 18 147/71 -- 99 % None (Room air) Sitting --              Pertinent Labs/Diagnostic Test Results:   Radiology:  No orders to display     Cardiology:  ECG 12 lead    by Interface, Ris Results In (01/16 1804)        GI:  EGD   Final Result by Nicky Yung MD (01/17 1535)   1 cm hiatal hernia   Stomach-noted couple of small fresh ulcers in the antrum measuring about 5    mm along with few other erosions and patchy inflammation.  Gastric body    biopsies done to check for H. pylori.   The duodenum appeared normal.         RECOMMENDATION:   Await pathology results    Schedule repeat EGD, due: 4/17/2025    Pantoprazole 40 mg daily, repeat EGD in 3 months      Patient has colonoscopy scheduled with EP next week                     Nicky Yung MD               Results from last 7 days   Lab Units 01/16/25  1721   WBC Thousand/uL 4.99   HEMOGLOBIN g/dL 8.3*   HEMATOCRIT % 27.0*   PLATELETS Thousands/uL 217   TOTAL NEUT ABS Thousands/µL 3.31     Results from last 7 days   Lab Units 01/16/25  1721   RETIC CT ABS  83,000   RETIC CT PCT % 2.86*     Results from last 7 days   Lab Units 01/17/25  0517 01/16/25  1721   SODIUM mmol/L 140 139   POTASSIUM mmol/L 4.1 4.3   CHLORIDE mmol/L 111* 108   CO2 mmol/L 24 26   ANION GAP mmol/L 5 5   BUN mg/dL 16 21   CREATININE mg/dL 0.73 0.90   EGFR ml/min/1.73sq m 89 69   CALCIUM mg/dL 8.2* 8.7     Results from last 7 days   Lab Units  01/16/25  1721   AST U/L 18   ALT U/L 17   ALK PHOS U/L 76   TOTAL PROTEIN g/dL 6.0*   ALBUMIN g/dL 3.9   TOTAL BILIRUBIN mg/dL 0.34         Results from last 7 days   Lab Units 01/17/25  0517 01/16/25  1721   GLUCOSE RANDOM mg/dL 78 86                   Results from last 7 days   Lab Units 01/17/25  0517   TSH 3RD GENERATON uIU/mL 1.898               Results from last 7 days   Lab Units 01/16/25  1721   FERRITIN ng/mL 5*   IRON SATURATION % 4*   IRON ug/dL 12*   TIBC ug/dL 336     Results from last 7 days   Lab Units 01/16/25  1721   TRANSFERRIN mg/dL 240     Results from last 7 days   Lab Units 01/17/25  0547   UNIT PRODUCT CODE  [id number]   UNIT NUMBER  [id number]   UNITABO  B   UNITRH  POS   CROSSMATCH  Compatible   UNIT DISPENSE STATUS  Presumed Trans   UNIT PRODUCT VOL ml 350                 Past Medical History:   Diagnosis Date    Endometrial cancer (HCC) 2011    Last assessed: 2/8/17; age 46    Sinusitis     Streptococcal pharyngitis      Present on Admission:   Gastroesophageal reflux disease with esophagitis   Anxiety and depression   Chronic migraine without aura      Admitting Diagnosis: Abnormal laboratory test [R89.9]  Symptomatic anemia [D64.9]  Gastrointestinal hemorrhage, unspecified gastrointestinal hemorrhage type [K92.2]  Age/Sex: 59 y.o. female    Network Utilization Review Department  ATTENTION: Please call with any questions or concerns to [phone] and carefully listen to the prompts so that you are directed to the right person. All voicemails are confidential.   For Discharge needs, contact Care Management DC Support Team at [phone] opt. 2  Send all requests for admission clinical reviews, approved or denied determinations and any other requests to dedicated fax number below belonging to the campus where the patient is receiving treatment. List of dedicated fax numbers for the Facilities:  FACILITY NAME UR FAX NUMBER   ADMISSION DENIALS (Administrative/Medical Necessity)  [phone]   DISCHARGE SUPPORT TEAM (NETWORK) [phone]   PARENT CHILD HEALTH (Maternity/NICU/Pediatrics) [phone]   [facility] [phone]   [facility] [phone]   [facility] [phone]   [facility] [phone]   [facility] [phone]   [facility] [phone]   [facility] [phone]   [facility] [phone]   [facility] [phone]   [facility] [phone]   [facility] [phone]   [facility] [phone]

## 2025-01-17 NOTE — ANESTHESIA POSTPROCEDURE EVALUATION
Post-Op Assessment Note    CV Status:  Stable  Pain Score: 0    Pain management: adequate       Mental Status:  Alert and awake   Hydration Status:  Euvolemic   PONV Controlled:  Controlled   Airway Patency:  Patent     Post Op Vitals Reviewed: Yes    No anethesia notable event occurred.    Staff: CRNA       Last Filed PACU Vitals:  Vitals Value Taken Time   Temp     Pulse     BP     Resp     SpO2

## 2025-01-17 NOTE — ASSESSMENT & PLAN NOTE
Pt w hx of anxiety and depression, states it has been well controlled on PTA Cymbalta 90 mg PO qd and Atarax 50 mg PO q HS. Pt states she has not been taking any of the other psychiatric medications on her home rx list. Will continue the ones she reports taking as above.

## 2025-01-17 NOTE — CONSULTS
Consultation - Gastroenterology   Name: Lauren Ledezma 59 y.o. female I MRN: [id number]  Unit/Bed#: -01 I Date of Admission: 1/16/2025   Date of Service: 1/17/2025 I Hospital Day: 1   Inpatient consult to gastroenterology  Consult performed by: ADAM Fontenot  Consult ordered by: Reba Romero DO        Physician Requesting Evaluation: Joanne Lazcano MD   Reason for Evaluation / Principal Problem: Symptomatic anemia GI hemorrhage    Assessment & Plan  Acute blood loss anemia  59 y.o. female with past medical history of endometrial cancer s/p KITA/BSO age 46, GERD, sinusitis, and migraines who presented with progressive dyspnea on exertion and generalized weakness.Patient did report having a viral illness over the past month and during that time she was having dark tarry stools which has since resolved. Review of medical records show hemoglobin 15.7 on 11/7/2024.  Baseline hemoglobin 14-15.  Stool for occult blood positive in ED.  Patient reports frequent use of Excedrin Migraines for headaches.  Lab work on admission showed hemoglobin 8.3.  Blood loss anemia may be secondary to upper GI bleed from esophagitis, ulceration, AVM, or lesion.    -Monitor hemoglobin  -Transfuse blood products as needed to keep hemoglobin greater than 7  -Monitor for signs of GI bleed  -Continue pantoprazole  -Keep n.p.o. for EGD today  -Scheduled for EGD.  Prep and procedure explained to patient in detail.  Further recommendations pending results of EGD.  -Patient reports she is scheduled for outpatient colonoscopy on Wednesday with [facility].  Gastroesophageal reflux disease with esophagitis  Patient has history of GERD.  Patient is currently taking no medication at home for GERD.    -Continue pantoprazole      History of Present Illness   HPI:  Lauren Ledezma is a 59 y.o. female with past medical history of endometrial cancer s/p KITA/BSO age 46, GERD, sinusitis, and migraines who presented with progressive dyspnea on exertion  and generalized weakness.  Patient did report having a viral illness over the past month and during that time she was having dark tarry stools which has since resolved.  Patient denies nausea, vomiting, acid reflux, heartburn, dysphagia, epigastric or abdominal pain.  Patient denies hematemesis or hematochezia.  Abdomen exam benign no abdominal tenderness or guarding.  Patient reports frequent use of Excedrin for migraines for her headaches.    Stool for occult blood positive in ED.  Stool was noted to be dark brown. Patient was given 1 unit packed red blood cells in ED. Review of medical records show hemoglobin 15.7 on 11/7/2024.  Baseline hemoglobin 14-15.  Lab work on admission showed hemoglobin 8.3.     Colonoscopy done 2/13/2020 showed diverticuli in sigmoid colon, descending colon, and transverse colon.  Patient reports EGD over 10 years ago, report is not available.  Patient does not smoke.  Patient denies alcohol use.  Patient denies illicit drug use or marijuana use.  No family history of gastric or colon cancer.  Positive family history of breast cancer mother.    Review of Systems  I have reviewed the patient's PMH, PSH, Social History, Family History, Meds, and Allergies  Historical Information   Past Medical History:   Diagnosis Date    Endometrial cancer (HCC) 2011    Last assessed: 2/8/17; age 46    Sinusitis     Streptococcal pharyngitis      Past Surgical History:   Procedure Laterality Date    CATARACT EXTRACTION, BILATERAL      DIAGNOSTIC LAPAROSCOPY      HYSTERECTOMY      LAPAROSCOPIC TOTAL HYSTERECTOMY      MOLE REMOVAL  07/12/2019    TOTAL ABDOMINAL HYSTERECTOMY W/ BILATERAL SALPINGOOPHORECTOMY Bilateral 2011    Last assessed: 2/8/17; age 46     Social History     Tobacco Use    Smoking status: Never    Smokeless tobacco: Never    Tobacco comments:     No second hand smoke exposure   Vaping Use    Vaping status: Never Used   Substance and Sexual Activity    Alcohol use: No    Drug use: No     Sexual activity: Not on file     E-Cigarette/Vaping    E-Cigarette Use Never User      E-Cigarette/Vaping Substances    Nicotine No     THC No     CBD No     Flavoring No     Other No     Unknown No      Family History   Problem Relation Age of Onset    Hypertension Mother     Breast cancer Mother 49    Other Father         pace maker     Prostate cancer Father 65    Colon cancer Father 73    Anxiety disorder Sister     Depression Sister     No Known Problems Maternal Grandmother     No Known Problems Maternal Grandfather     No Known Problems Paternal Grandmother     No Known Problems Paternal Grandfather     No Known Problems Maternal Aunt     No Known Problems Maternal Aunt     No Known Problems Paternal Aunt      Social History     Tobacco Use    Smoking status: Never    Smokeless tobacco: Never    Tobacco comments:     No second hand smoke exposure   Vaping Use    Vaping status: Never Used   Substance and Sexual Activity    Alcohol use: No    Drug use: No    Sexual activity: Not on file       Current Facility-Administered Medications:     acetaminophen (TYLENOL) tablet 650 mg, Q6H PRN    cloNIDine (CATAPRES) tablet 0.2 mg, Daily    dicyclomine (BENTYL) capsule 10 mg, TID PRN    DULoxetine (CYMBALTA) delayed release capsule 90 mg, Daily    eszopiclone (LUNESTA) tablet 3 mg, HS    hydrOXYzine HCL (ATARAX) tablet 50 mg, HS    iron sucrose (VENOFER) 300 mg in sodium chloride 0.9 % 250 mL IVPB, Once per day on Monday Wednesday Friday, Last Rate: 300 mg (01/17/25 0812)    multivitamin-minerals (CENTRUM) tablet 1 tablet, Daily    pantoprazole (PROTONIX) injection 40 mg, Q12H GUNJAN    sodium chloride 0.9 % infusion, Continuous, Last Rate: 100 mL/hr (01/17/25 0806)    valACYclovir (VALTREX) tablet 500 mg, Daily    Prior to Admission Medications   Prescriptions Last Dose Informant Patient Reported? Taking?   ALPHAGAN P 0.1 %  Self Yes No   Sig: INSERT 1 DROPS OPHTHALMICALLY EVERY 12 HOURS INTO BOTH EYES   ALPRAZolam  (XANAX) 0.25 mg tablet 1/15/2025 Self No Yes   Sig: TAKE 1 TABLET (0.25 MG TOTAL) BY MOUTH 2 (TWO) TIMES A DAY AS NEEDED FOR ANXIETY   ALPRAZolam (XANAX) 0.5 mg tablet  Self Yes No   Sig: Take 0.5 mg by mouth daily as needed   DULoxetine (CYMBALTA) 30 mg delayed release capsule 1/15/2025 Self Yes Yes   Sig: Take 90 mg by mouth daily   Galcanezumab-gnlm 120 MG/ML SOAJ   No No   Sig: Inject 120 mg under the skin every 30 (thirty) days Starting second month   L-Methylfolate-Algae (DEPLIN 15) 15-90.314 MG CAPS  Self Yes No   Sig: Take 1 capsule by mouth daily   Multiple Vitamins-Minerals (EYE SUPPORT PO) Not Taking Self Yes No   Sig: Take 1 tablet by mouth daily    Patient not taking: Reported on 1/16/2025   Multiple Vitamins-Minerals (VISION FORMULA 2 PO) Not Taking  Yes No   Sig: Take by mouth daily VISION FORMULA EYE HEALTH ORAL CAPSULE   Patient not taking: Reported on 1/16/2025   OLANZapine (ZyPREXA) 5 mg tablet Unknown Self No No   Sig: Take 1 tablet (5 mg total) by mouth daily at bedtime   Patient not taking: Reported on 1/16/2025   OnabotulinumtoxinA (BOTOX IJ)  Self Yes No   Sig: Inject as directed   Travoprost (TRAVATAN OP)   Yes No   Sig: Administer 1 drop to both eyes daily   Ubrelvy 100 MG tablet 1/15/2025  No Yes   Sig: TAKE 1 TABLET BY MOUTH AS NEEDED (MIGRAINE) MAY REPEAT IN 2 HOURS IF NEEDED. MAX 2 IN 24 HOURS   Ubrelvy 50 MG tablet   No No   Sig: TAKE AT TABLET AT ONSET OF MIGRAINE, MAY REPEAT IN 2 HOURS IF NEEDED.   Patient taking differently: Take 50 mg by mouth daily as needed   acetaminophen (TYLENOL) 500 mg tablet  Self Yes No   Sig: Take 1,000 mg by mouth as needed for mild pain (3-4 times a week)    cloNIDine (CATAPRES) 0.2 mg tablet 1/15/2025 Self Yes Yes   Sig: Take 0.2 mg by mouth daily    clonazePAM (KlonoPIN) 0.5 mg tablet  Self Yes No   Sig: Take 0.5 mg by mouth daily    cyproheptadine (PERIACTIN) 4 mg tablet   No No   Sig: TAKE 1 TABLET (4 MG TOTAL) BY MOUTH DAILY AT BEDTIME AS NEEDED FOR  ALLERGIES (HEADACHES)   dexamethasone (DECADRON) 1 mg tablet Not Taking Self No No   Sig: Take 1 tablet (1 mg total) by mouth daily with breakfast   Patient not taking: Reported on 2025   dicyclomine (BENTYL) 10 mg capsule Past Month  Yes Yes   Sig: Take 10 mg by mouth Three times daily as needed   estradiol (ESTRACE) 0.1 mg/g vaginal cream More than a month Self Yes No   Sig: Insert 2 g into the vagina as needed    eszopiclone (LUNESTA) 3 MG tablet 1/15/2025 Self Yes Yes   Sig: Take 3 mg by mouth daily   hydrOXYzine HCL (ATARAX) 50 mg tablet 1/15/2025 Self Yes Yes   Sig: Take 50 mg by mouth daily at bedtime Alternates with the 25 mg.   ibuprofen (MOTRIN) 200 mg tablet Past Month Self Yes Yes   Sig: Take 400 mg by mouth as needed for mild pain   lidocaine (XYLOCAINE) 5 % ointment  Self Yes No   Sig: Apply 1 application topically as needed    meloxicam (MOBIC) 15 mg tablet Past Month  Yes Yes   Sig: Take 15 mg by mouth daily   methocarbamol (ROBAXIN) 750 mg tablet Past Month  Yes Yes   Sig: Take 750 mg by mouth 4 (four) times a day as needed   prochlorperazine (COMPAZINE) 10 mg tablet Not Taking Self No No   Sig: Take 1 tablet (10 mg total) by mouth every 8 (eight) hours as needed (migraine)   Patient not taking: Reported on 2025   tolnaftate (TINACTIN) 1 % external solution   Yes No   Si mL   triamcinolone (KENALOG) 0.1 % cream  Self Yes No   Sig: APPLY TO SKIN TWICE DAILY X 1-2 WEEKS THEN AS NEEDED FOR RASH   valACYclovir (VALTREX) 500 mg tablet 1/15/2025 Self Yes Yes   Sig: Take 1 tablet by mouth daily      Facility-Administered Medications: None     Patient has no known allergies.    Objective :  Temp:  [98.1 °F (36.7 °C)-98.6 °F (37 °C)] 98.1 °F (36.7 °C)  HR:  [72-98] 75  BP: (120-153)/(56-71) 137/68  Resp:  [16-18] 18  SpO2:  [97 %-99 %] 97 %  O2 Device: None (Room air)    Physical Exam      Lab Results: I have reviewed the following results:

## 2025-01-17 NOTE — ASSESSMENT & PLAN NOTE
IV Protonix 40 mg BID   Plan for EGD tomorrow  Avoid NSAIDs  See principal problem for further details.

## 2025-01-17 NOTE — ASSESSMENT & PLAN NOTE
58 YO F with hx of migraines presented with symptomatic anemia endorsing progressive dyspnea on exertion and generalized weakness. Of note she reports having a GI viral illness over the past month and during that time endorsed dark tarry stools which have since resolved. Denies any hematemesis, and since then denies any hematochezia/melena. Per review of labs in Care Everywhere, pt's hemoglobin baseline appears to be 14, which was the case up until November. Recent outpatient labs reveal Hb of 8.8 last week, and on admission today 8.3. VS on arrival -- BP and heart rate wnl. Pt was given 1u pRBC in ED d/t symptomatic anemia. Stool guaiac positive.    Of note, pt frequently uses NSAIDs for migraines and MSK pain, as well as recent viral illness and use of dexamethasone. Given pt's presentation unlikely to be an active bleed. However given pt is experiencing symptomatic anemia will admit for further work up and plan for EGD, pending GI consult.     Consult to GI appreciated  Plan for EGD today  Pt has outpatient colonoscopy scheduled next Wednesday   IV Protonix 40 mg q12h  Per iron panel: pt has iron count of 12 (down from 105 in November), iron saturation of 4 (down from 21 in Nov), and ferritin of 5 (down from 12).    Continue IV iron infusion  Labs for folate, B12 ordered to r/o combined anemia  B12 wnl (506)  Folate pending  Repeat CBC in AM

## 2025-01-17 NOTE — PROGRESS NOTES
Progress Note - Hospitalist   Name: Lauren Ledezma 59 y.o. female I MRN: [id number]  Unit/Bed#: -01 I Date of Admission: 1/16/2025   Date of Service: 1/17/2025 I Hospital Day: 1    Assessment & Plan  Acute blood loss anemia  58 YO F with hx of migraines presented with symptomatic anemia endorsing progressive dyspnea on exertion and generalized weakness. Of note she reports having a GI viral illness over the past month and during that time endorsed dark tarry stools which have since resolved. Denies any hematemesis, and since then denies any hematochezia/melena. Per review of labs in Care Everywhere, pt's hemoglobin baseline appears to be 14, which was the case up until November. Recent outpatient labs reveal Hb of 8.8 last week, and on admission today 8.3. VS on arrival -- BP and heart rate wnl. Pt was given 1u pRBC in ED d/t symptomatic anemia. Stool guaiac positive.    Of note, pt frequently uses NSAIDs for migraines and MSK pain, as well as recent viral illness and use of dexamethasone. Given pt's presentation unlikely to be an active bleed. However given pt is experiencing symptomatic anemia will admit for further work up and plan for EGD, pending GI consult.     Consult to GI appreciated  Plan for EGD today  Pt has outpatient colonoscopy scheduled next Wednesday   IV Protonix 40 mg q12h  Per iron panel: pt has iron count of 12 (down from 105 in November), iron saturation of 4 (down from 21 in Nov), and ferritin of 5 (down from 12).    Continue IV iron infusion  Labs for folate, B12 ordered to r/o combined anemia  B12 wnl (506)  Folate pending  Repeat CBC in AM  Dyspnea  Pt's dyspnea and timeline of symptoms coincide with decrease in hemoglobin. Further workup will address source of anemia and expect resolution of dyspnea. Unlikely to be cardiopulmonary etiology. Per records in Care Everywhere:   Ultrasound LLE 01/07/25 for LE swelling: normal study; no sonographic evidence of DVT in visualized LLE  Echo  12/04/24: LV - systolic function normal with EF of 61-65%, no diastolic dysfunction. Mild tricuspid regurgitation. No pericardial effusion.   Currently denies any chest pain or palpitations. VS stable. EKG wnl.   Given timeline of symptoms, this dyspnea is most likely 2/2 symptomatic anemia.   Gastroesophageal reflux disease with esophagitis  IV Protonix 40 mg q12h   Plan for EGD tomorrow  Avoid NSAIDs  See principal problem for further details.  Chronic migraine without aura  Pt w hx of migraines, frequent NSAID use. [facility] Pt has been prescribed Ubrelvy.   No associated FND on examination.     Ubrelvy not on hospital formulary. Will give modified migraine cocktail (NS, Magnesium, Reglan) and avoid bright lights/sounds.   Anxiety and depression  Pt w hx of anxiety and depression, states it has been well controlled on PTA Cymbalta 90 mg PO qd and Atarax 50 mg PO q HS. Pt states she has not been taking any of the other psychiatric medications on her home rx list. Will continue the ones she reports taking as above.     VTE Pharmacologic Prophylaxis: VTE Score: 5 High Risk (Score >/= 5) - Pharmacological DVT Prophylaxis Contraindicated. Sequential Compression Devices Ordered.    Mobility:   Basic Mobility Inpatient Raw Score: 24  JH-HLM Goal: 8: Walk 250 feet or more  JH-HLM Achieved: 8: Walk 250 feet ot more  JH-HLM Goal achieved. Continue to encourage appropriate mobility.    Patient Centered Rounds: I performed bedside rounds with nursing staff today.   Discussions with Specialists or Other Care Team Provider: Gastroenterology    Education and Discussions with Family / Patient: Updated  () via phone.    Current Length of Stay: 1 day(s)  Current Patient Status: Inpatient   Certification Statement: The patient will continue to require additional inpatient hospital stay due to symptomatic anemia and pending gastroenterological evaluation.  Discharge Plan: Anticipate  discharge in 24-48 hrs to home.    Code Status: Level 1 - Full Code    Subjective   Pt is seen and evaluated at bedside. No acute events overnight. Pt states she feels okay and is agreeable to undergoing EGD today if GI sees fit. She denies any worsening shortness of breath, stating her dyspnea has improved due to lack of activity. Denies any chest pain, palpitations, and headaches. She denies having a bowel movement yet today, and denies any abdominal pain, nausea, or vomiting.     Objective :  Temp:  [98.1 °F (36.7 °C)-98.6 °F (37 °C)] 98.1 °F (36.7 °C)  HR:  [72-98] 75  BP: (120-153)/(56-71) 137/68  Resp:  [16-18] 18  SpO2:  [97 %-99 %] 97 %  O2 Device: None (Room air)    Body mass index is 25.28 kg/m².     Input and Output Summary (last 24 hours):     Intake/Output Summary (Last 24 hours) at 2025 0823  Last data filed at 2025 0805  Gross per 24 hour   Intake 1302.51 ml   Output 0 ml   Net 1302.51 ml       Physical Exam  Vitals reviewed.   Constitutional:       General: She is not in acute distress.     Appearance: She is not toxic-appearing.   HENT:      Head: Normocephalic and atraumatic.      Right Ear: External ear normal.      Left Ear: External ear normal.      Nose: Nose normal.      Mouth/Throat:      Mouth: Mucous membranes are moist.      Pharynx: Oropharynx is clear.   Eyes:      Extraocular Movements: Extraocular movements intact.   Cardiovascular:      Rate and Rhythm: Normal rate and regular rhythm.      Pulses: Normal pulses.   Pulmonary:      Effort: Pulmonary effort is normal. No respiratory distress.      Breath sounds: No wheezing.   Abdominal:      General: There is no distension.      Tenderness: There is no abdominal tenderness. There is no guarding.   Musculoskeletal:      Cervical back: Normal range of motion and neck supple.      Right lower le+ Pitting Edema present.      Left lower le+ Pitting Edema present.   Skin:     General: Skin is dry.      Capillary Refill:  Capillary refill takes less than 2 seconds.   Neurological:      Mental Status: She is alert.   Psychiatric:         Behavior: Behavior is cooperative.             Lab Results: I have reviewed the following results:   Results from last 7 days   Lab Units 01/16/25  1721   WBC Thousand/uL 4.99   HEMOGLOBIN g/dL 8.3*   HEMATOCRIT % 27.0*   PLATELETS Thousands/uL 217   SEGS PCT % 67   LYMPHO PCT % 25   MONO PCT % 7   EOS PCT % 1     Results from last 7 days   Lab Units 01/17/25  0517 01/16/25  1721   SODIUM mmol/L 140 139   POTASSIUM mmol/L 4.1 4.3   CHLORIDE mmol/L 111* 108   CO2 mmol/L 24 26   BUN mg/dL 16 21   CREATININE mg/dL 0.73 0.90   ANION GAP mmol/L 5 5   CALCIUM mg/dL 8.2* 8.7   ALBUMIN g/dL  --  3.9   TOTAL BILIRUBIN mg/dL  --  0.34   ALK PHOS U/L  --  76   ALT U/L  --  17   AST U/L  --  18   GLUCOSE RANDOM mg/dL 78 86                 Last 24 Hours Medication List:     Current Facility-Administered Medications:     acetaminophen (TYLENOL) tablet 650 mg, Q6H PRN    cloNIDine (CATAPRES) tablet 0.2 mg, Daily    dicyclomine (BENTYL) capsule 10 mg, TID PRN    DULoxetine (CYMBALTA) delayed release capsule 90 mg, Daily    eszopiclone (LUNESTA) tablet 3 mg, HS    hydrOXYzine HCL (ATARAX) tablet 50 mg, HS    iron sucrose (VENOFER) 300 mg in sodium chloride 0.9 % 250 mL IVPB, Once per day on Monday Wednesday Friday, Last Rate: 300 mg (01/17/25 0812)    multivitamin-minerals (CENTRUM) tablet 1 tablet, Daily    pantoprazole (PROTONIX) injection 40 mg, Q12H GUNJAN    sodium chloride 0.9 % infusion, Continuous, Last Rate: 100 mL/hr (01/17/25 0806)    valACYclovir (VALTREX) tablet 500 mg, Daily    Administrative Statements   Today, Patient Was Seen By: Reba Romero DO  PGY-I  I have spent a total time of >30 minutes in caring for this patient on the day of the visit/encounter including Risks and benefits of tx options, Instructions for management, Patient and family education, Risk factor reductions, Impressions, Counseling /  Coordination of care, Documenting in the medical record, Reviewing / ordering tests, medicine, procedures  , Obtaining or reviewing history  , and Communicating with other healthcare professionals .    **Please Note: This note may have been constructed using a voice recognition system.**

## 2025-01-17 NOTE — ASSESSMENT & PLAN NOTE
Pt w hx of migraines, frequent NSAID use. [facility] Pt has been prescribed Ubrelvy.   No associated FND on examination.     Ubrelvy not on hospital formulary. Will give modified migraine cocktail (NS, Magnesium, Reglan) and avoid bright lights/sounds.

## 2025-01-17 NOTE — ASSESSMENT & PLAN NOTE
58 YO F with hx of migraines presented with symptomatic anemia endorsing progressive dyspnea on exertion and generalized weakness. Of note she reports having a GI viral illness over the past month and during that time endorsed dark tarry stools which have since resolved. Denies any hematemesis, and since then denies any hematochezia/melena. Per review of labs in Care Everywhere, pt's hemoglobin baseline appears to be 14, which was the case up until November. Recent outpatient labs reveal Hb of 8.8 last week, and on admission today 8.3. VS on arrival -- BP and heart rate wnl. Pt was given 1u pRBC in ED d/t symptomatic anemia. Stool guaiac positive.    Of note, pt frequently uses NSAIDs for migraines and MSK pain, as well as recent viral illness and use of dexamethasone. Given pt's presentation unlikely to be an active bleed. However given pt is experiencing symptomatic anemia will admit for further work up and plan for EGD tomorrow, pending GI consult.     Consult to GI pending  Anticipate EGD tomorrow. NPO at midnight.   IV Protonix 40 mg BID  IV iron infusion  Pending iron studies, retic count, B12, folate   Repeat CBC in the AM

## 2025-01-17 NOTE — QUICK NOTE
Patient presenting with symptomatic anemia, she reports noticing intermittent black stools this month, she also feels generally weak fatigued having dyspnea on exertion.  Seems to have had a viral gastroenteritis at the beginning of this year  She does take iron but she denies noticing discolored stool in the past.  Her hemoglobin baseline is 14, last year in November she had a hemoglobin around 14-15  About 1 week ago she had a CBC which showed hemoglobin 8.8 so there has certainly been a drop since last year, however her hemoglobin has been stable for the past week making an acute ongoing GI bleed less likely.    I suspect in the setting of a viral infection ongoing NSAID use for many months and recent use of Decadron patient has developed a GI bleed, possibly peptic ulcer.  However I do not think she has active bleeding at this time as demonstrated by stable hemoglobin over the past week.   However she may benefit from inpatient endoscopy will consult gastroenterology      Will start on Protonix IV twice daily  Will check iron panel reticulocyte count  Will start supplementing iron inpatient every day that she remains here  Her MCV is not microcytic making it less likely that she has ongoing chronic bleeding as I would expect her to start shifting from normocytic, and I do not think she has a combined deficiency such as a macrocytic anemia as her RDW is normal however for completeness can check a B12 TSH folic acid

## 2025-01-17 NOTE — ASSESSMENT & PLAN NOTE
Pt's dyspnea and timeline of symptoms coincide with decrease in hemoglobin. Further workup will address source of anemia and expect resolution of dyspnea. Unlikely to be cardiopulmonary etiology. Per records in Care Everywhere:   Ultrasound LLE 01/07/25 for LE swelling: normal study; no sonographic evidence of DVT in visualized LLE  Echo 12/04/24: LV - systolic function normal with EF of 61-65%, no diastolic dysfunction. Mild tricuspid regurgitation. No pericardial effusion.   Currently denies any chest pain or palpitations. VS stable. EKG wnl.   Given timeline of symptoms, this dyspnea is most likely 2/2 symptomatic anemia.

## 2025-01-17 NOTE — ANESTHESIA PREPROCEDURE EVALUATION
Procedure:  EGD    EKG: NSR    Echo 2024:    Left Ventricle: Systolic function is normal with an ejection fraction   of 61-65%. There is no diastolic dysfunction and normal left atrial   pressure.    Tricuspid Valve: There is mild regurgitation. The right ventricular   systolic pressure is normal.     Pulmonic Valve: The estimated pulmonary artery systolic pressure is   31.4 mmHg.     Hgb 8.3    Relevant Problems   CARDIO   (+) Chronic migraine without aura      GI/HEPATIC   (+) Gastroesophageal reflux disease with esophagitis      GYN   (+) Endometrial cancer (HCC)      HEMATOLOGY   (+) Acute blood loss anemia   (+) Iron deficiency anemia   (+) Thrombocytopenia (HCC)      NEURO/PSYCH   (+) Anxiety and depression   (+) Chronic migraine without aura   (+) Chronic pelvic pain in female      PULMONARY   (+) Dyspnea   #Acute anemia  # Palpitations  # GI sickness ~2 weeks ago    Meds:  Clonidine    METS:  >4 METs    NPO?:  Last night     Physical Exam    Airway    Mallampati score: II         Dental   No notable dental hx     Cardiovascular      Pulmonary      Other Findings  post-pubertal.      Anesthesia Plan  ASA Score- 3     Anesthesia Type- IV sedation with anesthesia with ASA Monitors.         Additional Monitors:     Airway Plan:            Plan Factors-    Chart reviewed. EKG reviewed.   Patient summary reviewed.                  Induction- intravenous.    Postoperative Plan-     Perioperative Resuscitation Plan - Level 1 - Full Code.       Informed Consent- Anesthetic plan and risks discussed with patient.  I personally reviewed this patient with the CRNA. Discussed and agreed on the Anesthesia Plan with the CRNA..      NPO Status:  Vitals Value Taken Time   Date of last liquid 01/17/25 01/17/25 1328   Time of last liquid 0800 01/17/25 1328   Date of last solid 01/16/25 01/17/25 1328   Time of last solid 2130 01/17/25 1328

## 2025-01-17 NOTE — ASSESSMENT & PLAN NOTE
Patient has history of GERD.  Patient is currently taking no medication at home for GERD.    -Continue pantoprazole

## 2025-01-17 NOTE — ASSESSMENT & PLAN NOTE
59 y.o. female with past medical history of endometrial cancer s/p KITA/BSO age 46, GERD, sinusitis, and migraines who presented with progressive dyspnea on exertion and generalized weakness.Patient did report having a viral illness over the past month and during that time she was having dark tarry stools which has since resolved. Review of medical records show hemoglobin 15.7 on 11/7/2024.  Baseline hemoglobin 14-15.  Stool for occult blood positive in ED.  Patient reports frequent use of Excedrin Migraines for headaches.  Lab work on admission showed hemoglobin 8.3.  Blood loss anemia may be secondary to upper GI bleed from esophagitis, ulceration, AVM, or lesion.    -Monitor hemoglobin  -Transfuse blood products as needed to keep hemoglobin greater than 7  -Monitor for signs of GI bleed  -Continue pantoprazole  -Keep n.p.o. for EGD today  -Scheduled for EGD.  Prep and procedure explained to patient in detail.  Further recommendations pending results of EGD.  -Patient reports she is scheduled for outpatient colonoscopy on Wednesday with [facility].

## 2025-01-17 NOTE — ASSESSMENT & PLAN NOTE
IV Protonix 40 mg q12h   Plan for EGD tomorrow  Avoid NSAIDs  See principal problem for further details.

## 2025-01-18 VITALS
HEIGHT: 67 IN | SYSTOLIC BLOOD PRESSURE: 135 MMHG | RESPIRATION RATE: 16 BRPM | BODY MASS INDEX: 25.28 KG/M2 | TEMPERATURE: 98.2 F | HEART RATE: 83 BPM | OXYGEN SATURATION: 98 % | DIASTOLIC BLOOD PRESSURE: 82 MMHG

## 2025-01-18 LAB
ANION GAP SERPL CALCULATED.3IONS-SCNC: 8 MMOL/L (ref 4–13)
BUN SERPL-MCNC: 11 MG/DL (ref 5–25)
CALCIUM SERPL-MCNC: 8.5 MG/DL (ref 8.4–10.2)
CHLORIDE SERPL-SCNC: 110 MMOL/L (ref 96–108)
CO2 SERPL-SCNC: 24 MMOL/L (ref 21–32)
CREAT SERPL-MCNC: 0.8 MG/DL (ref 0.6–1.3)
ERYTHROCYTE [DISTWIDTH] IN BLOOD BY AUTOMATED COUNT: 13.9 % (ref 11.6–15.1)
GFR SERPL CREATININE-BSD FRML MDRD: 80 ML/MIN/1.73SQ M
GLUCOSE SERPL-MCNC: 76 MG/DL (ref 65–140)
HCT VFR BLD AUTO: 27.4 % (ref 34.8–46.1)
HGB BLD-MCNC: 9 G/DL (ref 11.5–15.4)
MCH RBC QN AUTO: 29.6 PG (ref 26.8–34.3)
MCHC RBC AUTO-ENTMCNC: 32.8 G/DL (ref 31.4–37.4)
MCV RBC AUTO: 90 FL (ref 82–98)
PLATELET # BLD AUTO: 188 THOUSANDS/UL (ref 149–390)
PMV BLD AUTO: 9.8 FL (ref 8.9–12.7)
POTASSIUM SERPL-SCNC: 4.3 MMOL/L (ref 3.5–5.3)
RBC # BLD AUTO: 3.04 MILLION/UL (ref 3.81–5.12)
SODIUM SERPL-SCNC: 142 MMOL/L (ref 135–147)
WBC # BLD AUTO: 5.44 THOUSAND/UL (ref 4.31–10.16)

## 2025-01-18 PROCEDURE — 80048 BASIC METABOLIC PNL TOTAL CA: CPT | Performed by: INTERNAL MEDICINE

## 2025-01-18 PROCEDURE — 85027 COMPLETE CBC AUTOMATED: CPT | Performed by: INTERNAL MEDICINE

## 2025-01-18 PROCEDURE — 99239 HOSP IP/OBS DSCHRG MGMT >30: CPT | Performed by: INTERNAL MEDICINE

## 2025-01-18 RX ORDER — FERROUS SULFATE 324(65)MG
324 TABLET, DELAYED RELEASE (ENTERIC COATED) ORAL
Qty: 30 TABLET | Refills: 0 | Status: SHIPPED | OUTPATIENT
Start: 2025-01-18

## 2025-01-18 RX ORDER — PANTOPRAZOLE SODIUM 40 MG/1
40 TABLET, DELAYED RELEASE ORAL DAILY
Qty: 30 TABLET | Refills: 0 | Status: SHIPPED | OUTPATIENT
Start: 2025-01-18

## 2025-01-18 RX ADMIN — MULTIPLE VITAMINS W/ MINERALS TAB 1 TABLET: TAB ORAL at 09:16

## 2025-01-18 RX ADMIN — ACETAMINOPHEN 650 MG: 325 TABLET, FILM COATED ORAL at 03:04

## 2025-01-18 RX ADMIN — SODIUM CHLORIDE, SODIUM LACTATE, POTASSIUM CHLORIDE, AND CALCIUM CHLORIDE 125 ML/HR: .6; .31; .03; .02 INJECTION, SOLUTION INTRAVENOUS at 03:03

## 2025-01-18 RX ADMIN — DULOXETINE HYDROCHLORIDE 90 MG: 30 CAPSULE, DELAYED RELEASE ORAL at 09:16

## 2025-01-18 RX ADMIN — CLONIDINE HYDROCHLORIDE 0.2 MG: 0.1 TABLET ORAL at 09:16

## 2025-01-18 RX ADMIN — VALACYCLOVIR 500 MG: 500 TABLET, FILM COATED ORAL at 09:16

## 2025-01-18 RX ADMIN — PANTOPRAZOLE SODIUM 40 MG: 40 INJECTION, POWDER, FOR SOLUTION INTRAVENOUS at 09:16

## 2025-01-18 NOTE — ASSESSMENT & PLAN NOTE
Pt w hx of migraines, frequent NSAID use. [facility] Pt has been prescribed Ubrelvy.   No associated FND on examination.   Ubrelvy not on hospital formulary. Pt was given modified migraine cocktail (NS, Magnesium, Reglan, Sumatriptan)    F/u with outpatient neurologist regarding prophylactic migraine medications given frequency (nearly daily) of chronic migraines.

## 2025-01-18 NOTE — ANESTHESIA POSTPROCEDURE EVALUATION
Post-Op Assessment Note    CV Status:  Stable    Pain management: adequate       Mental Status:  Alert and awake   Hydration Status:  Euvolemic   PONV Controlled:  Controlled   Airway Patency:  Patent     Post Op Vitals Reviewed: Yes    No anethesia notable event occurred.    Staff: Anesthesiologist, CRNA           Last Filed PACU Vitals:  Vitals Value Taken Time   Temp 98.2 °F (36.8 °C) 01/17/25 1536   Pulse 74 01/17/25 1609   /58 01/17/25 1609   Resp 18 01/17/25 1609   SpO2 97 % 01/17/25 1545       Modified Simon:     Vitals Value Taken Time   Activity 2 01/17/25 1545   Respiration 2 01/17/25 1545   Circulation 2 01/17/25 1545   Consciousness 2 01/17/25 1545   Oxygen Saturation 2 01/17/25 1545     Modified Simon Score: 10

## 2025-01-18 NOTE — DISCHARGE INSTR - AVS FIRST PAGE
Dear Lauren Ledezma,     It was our pleasure to care for you here at [facility].  It is our hope that we were always able to exceed the expected standards for your care during your stay.  You were hospitalized due to anemia.  You were cared for on the 1S and 4W floor by Reba Romero DO under the service of Joanne Lazcano MD with the [facility]ist [facility] who covers for your primary care physician (PCP), Jimena Barros DO, while you were hospitalized.  If you have any questions or concerns related to this hospitalization, you may contact us at .  For follow up as well as any medication refills, we recommend that you follow up with your primary care physician.  A registered nurse will reach out to you by phone within a few days after your discharge to answer any additional questions that you may have after going home.  However, at this time we provide for you here, the most important instructions / recommendations at discharge:       Notable Medication Adjustments -   Please STOP taking ibuprofen (Motrin), meloxicam (Mobic), and dexamethasone (Decadron).   Please START taking pantoprazole (Protonix) 40 mg daily, and ferrous sulfate 324 mg daily.   Testing Required after Discharge -   CBC to follow-up on anemia ** Please contact your PCP to request orders for this **  Repeat EGD within 3 months per Gastroenterology's recommendation.   Important follow up information -   Please follow-up with your outpatient Gastroenterology team for your colonoscopy this upcoming Wednesday. Continue to follow with GI regarding the results of the biopsy and colonoscopy. Please schedule a repeat EGD within 3 months as recommended.   Please follow-up with your PCP within 1-2 weeks of discharge from the hospital.   Please continue to follow-up with your outpatient specialists, including Neurology and Cardiology.   Other Instructions -   Please avoid all NSAIDs  due to GI bleed.  If you notice any blood in your stool, or vomit blood, or develop any shortness of breath or chest pain, please call your doctor & go to the nearest Emergency Room.   Please review this entire after visit summary as additional general instructions including medication list, appointments, activity, diet, any pertinent wound care, and other additional recommendations from your care team that may be provided for you.      Sincerely,     Reba Romero, DO

## 2025-01-18 NOTE — ASSESSMENT & PLAN NOTE
60 YO F with hx of migraines presented with symptomatic anemia endorsing progressive dyspnea on exertion and generalized weakness. Of note she reports having a GI viral illness over the past month and during that time endorsed dark tarry stools which have since resolved. Denies any hematemesis, and since then denies any hematochezia/melena. Per review of labs in Care Everywhere, pt's hemoglobin baseline appears to be 14, which was the case up until November. Recent outpatient labs reveal Hb of 8.8 last week, and on admission today 8.3. VS on arrival -- BP and heart rate wnl. Pt was given 1u pRBC in ED d/t symptomatic anemia. Stool guaiac positive.    Of note, pt frequently uses NSAIDs for migraines and MSK pain, as well as recent viral illness and use of dexamethasone. Given pt's presentation unlikely to be an active bleed. However given pt's symptomatic anemia pt was admitted and underwent EGD following GI consult.    Per GI consult and EGD performed :   EGD  revealed the followin cm hiatal hernia. Stomach-noted couple of small fresh ulcers in the antrum measuring about 5 mm along with few other erosions and patchy inflammation.  Gastric body biopsies done to check for H. pylori. The duodenum appeared normal.  Pt has outpatient colonoscopy scheduled next Wednesday with [facility]   F/u on biopsy results  Repeat EGD in 3 months per their recommendations  Pt received IV Protonix 40 mg q12h for a total of 3 doses.  D/c on PO Pantoprazole 40 mg qd  Per iron panel: pt has iron count of 12 (down from 105 in November), iron saturation of 4 (down from 21 in Nov), and ferritin of 5 (down from 12).    Pt received IV iron 300 mg x1 while inpatient  Ferrous sulfate 325 mg PO qd  If pt cannot tolerate PO can request PCP to schedule infusions outpatient  Repeat CBC w PCP outpatient  Labs for folate, B12 ordered to r/o combined anemia  B12 wnl (506)  Folate wnl (>22.3)  Counseled on avoiding NSAIDs and steroids.

## 2025-01-18 NOTE — DISCHARGE SUMMARY
Discharge Summary - Hospitalist   Name: Lauren Ledezma 59 y.o. female I MRN: [id number]  Unit/Bed#: W -01 I Date of Admission: 2025   Date of Service: 2025 I Hospital Day: 2     Assessment & Plan  Acute blood loss anemia  58 YO F with hx of migraines presented with symptomatic anemia endorsing progressive dyspnea on exertion and generalized weakness. Of note she reports having a GI viral illness over the past month and during that time endorsed dark tarry stools which have since resolved. Denies any hematemesis, and since then denies any hematochezia/melena. Per review of labs in Care Everywhere, pt's hemoglobin baseline appears to be 14, which was the case up until November. Recent outpatient labs reveal Hb of 8.8 last week, and on admission today 8.3. VS on arrival -- BP and heart rate wnl. Pt was given 1u pRBC in ED d/t symptomatic anemia. Stool guaiac positive.    Of note, pt frequently uses NSAIDs for migraines and MSK pain, as well as recent viral illness and use of dexamethasone. Given pt's presentation unlikely to be an active bleed. However given pt's symptomatic anemia pt was admitted and underwent EGD following GI consult.    Per GI consult and EGD performed :   EGD  revealed the followin cm hiatal hernia. Stomach-noted couple of small fresh ulcers in the antrum measuring about 5 mm along with few other erosions and patchy inflammation.  Gastric body biopsies done to check for H. pylori. The duodenum appeared normal.  Pt has outpatient colonoscopy scheduled next Wednesday with [facility]   F/u on biopsy results  Repeat EGD in 3 months per their recommendations  Pt received IV Protonix 40 mg q12h for a total of 3 doses.  D/c on PO Pantoprazole 40 mg qd  Per iron panel: pt has iron count of 12 (down from 105 in November), iron saturation of 4 (down from 21 in Nov), and ferritin of 5 (down from 12).    Pt received IV iron 300 mg x1 while inpatient  Ferrous sulfate 325 mg PO qd  If pt  cannot tolerate PO can request PCP to schedule infusions outpatient  Repeat CBC w PCP outpatient  Labs for folate, B12 ordered to r/o combined anemia  B12 wnl (506)  Folate wnl (>22.3)  Counseled on avoiding NSAIDs and steroids.  Dyspnea  Pt's dyspnea and timeline of symptoms coincide with decrease in hemoglobin. Further workup will address source of anemia and expect resolution of dyspnea. Unlikely to be cardiopulmonary etiology. Per records in Care Everywhere:   Ultrasound LLE 01/07/25 for LE swelling: normal study; no sonographic evidence of DVT in visualized LLE  Echo 12/04/24: LV - systolic function normal with EF of 61-65%, no diastolic dysfunction. Mild tricuspid regurgitation. No pericardial effusion.   Currently denies any chest pain or palpitations. VS stable. EKG wnl.   Given timeline of symptoms, this dyspnea is most likely 2/2 symptomatic anemia.   Gastroesophageal reflux disease with esophagitis  Please see under A&P of Principal Problem.  Chronic migraine without aura  Pt w hx of migraines, frequent NSAID use. [facility]. Pt has been prescribed Ubrelvy.   No associated FND on examination.   Ubrelvy not on hospital formulary. Pt was given modified migraine cocktail (NS, Magnesium, Reglan, Sumatriptan)    F/u with outpatient neurologist regarding prophylactic migraine medications given frequency (nearly daily) of chronic migraines.  Anxiety and depression  Pt w hx of anxiety and depression, states it has been well controlled on PTA Cymbalta 90 mg PO qd and Atarax 50 mg PO q HS. Pt states she has not been taking any of the other psychiatric medications on her home rx list.       Medical Problems       Resolved Problems  Date Reviewed: 11/12/2024   None       Discharging Physician / Practitioner: Reba Romero DO  PCP: Jimena Barros DO  Admission Date:   Admission Orders (From admission, onward)       Ordered        01/16/25 1819  INPATIENT ADMISSION  Once                           Discharge Date: 01/18/25    Consultations During Hospital Stay:  Gastroenterology    Procedures Performed:   EGD 01/17/25    Significant Findings / Test Results:   EGD 01/17/25:  IMPRESSION - 1 cm hiatal hernia. Stomach-noted couple of small fresh ulcers in the antrum measuring about 5 mm along with few other erosions and patchy inflammation.  Gastric body biopsies done to check for H. Pylori. The duodenum appeared normal.  Iron panel: ferritin - 5, iron saturation - 4, TIBC - 336, iron - 12, transferrin - 240, UIBC - 324    Incidental Findings:   N/A     Test Results Pending at Discharge (will require follow up):   Stomach tissue biopsy results     Outpatient Tests Requested:  CBC  Repeat EGD in 3 months    Complications:  None    Reason for Admission: symptomatic anemia, GI bleed    Hospital Course:   Lauren Ledezma is a 59 y.o. female patient who originally presented to the hospital on 1/16/2025 due to progressive dyspnea on exertion and weakness in the setting of anemia. Per review of records in Care Everywhere, pt's hemoglobin baseline appears to be 14, which was the case up until November. Recent outpatient labs reveal Hb of 8.8 last week, and on admission was 8.3. Her vital signs were largely stable in the ED. Her stool was guaiac positive. She reported one instance one month ago of dark tarry stools. She was given 1u pRBC in the ED d/t symptomatic anemia.  In anticipation of EGD, she was admitted and made NPO. She was placed on IV Protonix 40 mg q12h as well as IV fluids for hydration, and IV iron for iron deficiency evidenced by lab work. Gastroenterology was consulted, and pt underwent EGD on 1/17 which revealed ulcers, erosions, patchy inflammation. Gastric body biopsies were sent. Pt tolerated procedure well without complication. Since she was stable and no longer had any acute inpatient hospitalization needs, the team felt she could be safely discharged with established outpatient follow-up.  "Medication changes were reviewed with the patient including avoidance of NSAIDs and initiation of pantoprazole and ferrous sulfate. Pt has an upcoming colonoscopy with [facility] next week and was instructed to f/u on outpatient basis with GI and PCP.     Please see above list of diagnoses and related plan for additional information.     Condition at Discharge: stable    Discharge Day Visit / Exam:   Subjective:  Pt seen and evaluated at bedside. No acute events overnight. She does not appear to be in any acute distress. She reports tolerating the EGD well and denies any dysphagia, nausea/vomiting, fevers, or abdominal pain.  She reports her last bowel movement was yesterday. She does endorse chronic migraines that improved yesterday with the modified migraine cocktail. She is looking forward to being discharged and is aware of the necessary outpatient follow-up.  Medications were reviewed at length.  All questions/concerns were addressed.   Vitals: Blood Pressure: 135/82 (01/18/25 0710)  Pulse: 83 (01/18/25 0710)  Temperature: 98.2 °F (36.8 °C) (01/18/25 0710)  Temp Source: Temporal (01/17/25 1536)  Respirations: 16 (01/17/25 1916)  Height: 5' 7\" (170.2 cm) (01/16/25 1957)  SpO2: 98 % (01/18/25 0710)  Physical Exam  Vitals reviewed.   Constitutional:       General: She is not in acute distress.     Appearance: She is not toxic-appearing.   HENT:      Head: Normocephalic and atraumatic.      Right Ear: External ear normal.      Left Ear: External ear normal.      Nose: Nose normal.      Mouth/Throat:      Mouth: Mucous membranes are moist.      Pharynx: Oropharynx is clear.   Eyes:      Extraocular Movements: Extraocular movements intact.   Cardiovascular:      Rate and Rhythm: Normal rate and regular rhythm.      Pulses: Normal pulses.   Pulmonary:      Effort: Pulmonary effort is normal. No respiratory distress.      Breath sounds: No wheezing.   Abdominal:      General: There is no distension.      Tenderness: There is " no abdominal tenderness. There is no guarding.   Musculoskeletal:      Cervical back: Normal range of motion and neck supple.      Right lower le+ Pitting Edema present.      Left lower le+ Pitting Edema present.   Skin:     General: Skin is dry.      Capillary Refill: Capillary refill takes less than 2 seconds.   Neurological:      Mental Status: She is alert.   Psychiatric:         Behavior: Behavior is cooperative.          Discussion with Family: Attempted to update  () via phone. Unable to contact.    Discharge instructions/Information to patient and family:   See after visit summary for information provided to patient and family.      Provisions for Follow-Up Care:  See after visit summary for information related to follow-up care and any pertinent home health orders.      Mobility at time of Discharge:   Basic Mobility Inpatient Raw Score: 24  JH-HLM Goal: 8: Walk 250 feet or more  JH-HLM Achieved: 7: Walk 25 feet or more  HLM Goal NOT achieved. Continue to encourage mobility in post discharge setting.     Disposition:   Home    Planned Readmission: No    Discharge Medications:  See after visit summary for reconciled discharge medications provided to patient and/or family.      Administrative Statements   Discharge Statement:  I have spent a total time of >60 minutes in caring for this patient on the day of the visit/encounter. >30 minutes of time was spent on: Diagnostic results, Prognosis, Risks and benefits of tx options, Instructions for management, Patient and family education, Importance of tx compliance, Risk factor reductions, Impressions, Counseling / Coordination of care, Documenting in the medical record, Reviewing / ordering tests, medicine, procedures  , and Communicating with other healthcare professionals .    **Please Note: This note may have been constructed using a voice recognition system**

## 2025-01-18 NOTE — PLAN OF CARE
Problem: GASTROINTESTINAL - ADULT  Goal: Minimal or absence of nausea and/or vomiting  Description: INTERVENTIONS:  - Administer IV fluids if ordered to ensure adequate hydration  - Maintain NPO status until nausea and vomiting are resolved  - Nasogastric tube if ordered  - Administer ordered antiemetic medications as needed  - Provide nonpharmacologic comfort measures as appropriate  - Advance diet as tolerated, if ordered  - Consider nutrition services referral to assist patient with adequate nutrition and appropriate food choices  Outcome: Progressing  Goal: Maintains or returns to baseline bowel function  Description: INTERVENTIONS:  - Assess bowel function  - Encourage oral fluids to ensure adequate hydration  - Administer IV fluids if ordered to ensure adequate hydration  - Administer ordered medications as needed  - Encourage mobilization and activity  - Consider nutritional services referral to assist patient with adequate nutrition and appropriate food choices  Outcome: Progressing  Goal: Maintains adequate nutritional intake  Description: INTERVENTIONS:  - Monitor percentage of each meal consumed  - Identify factors contributing to decreased intake, treat as appropriate  - Assist with meals as needed  - Monitor I&O, weight, and lab values if indicated  - Obtain nutrition services referral as needed  Outcome: Progressing  Goal: Oral mucous membranes remain intact  Description: INTERVENTIONS  - Assess oral mucosa and hygiene practices  - Implement preventative oral hygiene regimen  - Implement oral medicated treatments as ordered  - Initiate Nutrition services referral as needed  Outcome: Progressing     Problem: HEMATOLOGIC - ADULT  Goal: Maintains hematologic stability  Description: INTERVENTIONS  - Assess for signs and symptoms of bleeding or hemorrhage  - Monitor labs  - Administer supportive blood products/factors as ordered and appropriate  Outcome: Progressing

## 2025-01-18 NOTE — ASSESSMENT & PLAN NOTE
Pt w hx of anxiety and depression, states it has been well controlled on PTA Cymbalta 90 mg PO qd and Atarax 50 mg PO q HS. Pt states she has not been taking any of the other psychiatric medications on her home rx list.

## 2025-01-20 NOTE — UTILIZATION REVIEW
NOTIFICATION OF ADMISSION DISCHARGE   This is a Notification of Discharge from [facility]. Please be advised that this patient has been discharge from our facility. Below you will find the admission and discharge date and time including the patient’s disposition.   UTILIZATION REVIEW CONTACT:  Nahomy Gamez  Utilization   Network Utilization Review Department  Phone: [phone] x carefully listen to the prompts. All voicemails are confidential.  Email: [email or web address]     ADMISSION INFORMATION  PRESENTATION DATE: 1/16/2025  4:53 PM  OBERVATION ADMISSION DATE: N/A  INPATIENT ADMISSION DATE: 1/16/25  6:19 PM   DISCHARGE DATE: 1/18/2025 12:47 PM   DISPOSITION:Home/Self Care    Network Utilization Review Department  ATTENTION: Please call with any questions or concerns to [phone] and carefully listen to the prompts so that you are directed to the right person. All voicemails are confidential.   For Discharge needs, contact Care Management DC Support Team at [phone] opt. 2  Send all requests for admission clinical reviews, approved or denied determinations and any other requests to dedicated fax number below belonging to the campus where the patient is receiving treatment. List of dedicated fax numbers for the Facilities:  FACILITY NAME UR FAX NUMBER   ADMISSION DENIALS (Administrative/Medical Necessity) [phone]   DISCHARGE SUPPORT TEAM ([facility]) [phone]   PARENT CHILD HEALTH (Maternity/NICU/Pediatrics) [phone]   [facility] [phone]   [facility] [phone]   [facility] [phone]   [facility] [phone]   [facility] [phone]   [facility] [phone]   [facility] [phone]   [facility] [phone]    [facility] [phone]   [facility] [phone]   [facility] [phone]   [facility] [phone]

## 2025-01-23 PROCEDURE — 88342 IMHCHEM/IMCYTCHM 1ST ANTB: CPT | Performed by: STUDENT IN AN ORGANIZED HEALTH CARE EDUCATION/TRAINING PROGRAM

## 2025-01-23 PROCEDURE — 88341 IMHCHEM/IMCYTCHM EA ADD ANTB: CPT | Performed by: STUDENT IN AN ORGANIZED HEALTH CARE EDUCATION/TRAINING PROGRAM

## 2025-01-23 PROCEDURE — 88305 TISSUE EXAM BY PATHOLOGIST: CPT | Performed by: STUDENT IN AN ORGANIZED HEALTH CARE EDUCATION/TRAINING PROGRAM

## 2025-01-24 ENCOUNTER — TELEPHONE (OUTPATIENT)
Dept: GASTROENTEROLOGY | Facility: AMBULARY SURGERY CENTER | Age: 60
End: 2025-01-24

## 2025-01-24 ENCOUNTER — RESULTS FOLLOW-UP (OUTPATIENT)
Age: 60
End: 2025-01-24

## 2025-01-29 ENCOUNTER — PREP FOR PROCEDURE (OUTPATIENT)
Age: 60
End: 2025-01-29

## 2025-01-29 DIAGNOSIS — K25.9 GASTRIC ULCER, UNSPECIFIED CHRONICITY, UNSPECIFIED WHETHER GASTRIC ULCER HEMORRHAGE OR PERFORATION PRESENT: Primary | ICD-10-CM

## 2025-01-29 DIAGNOSIS — A04.8 H. PYLORI INFECTION: Primary | ICD-10-CM

## 2025-01-29 NOTE — TELEPHONE ENCOUNTER
Pt returning call to go over results and schedule EGD.  Per notes show pt needs an EGD in 3 months,     Scheduled date of EGD(as of today):4/28/25  Physician performing EGD:Dr. Yung  Location of EGD:[facility]  Instructions reviewed with patient by:pt requesting egd prep instructions mailed out to address on file.  Clearances: na

## 2025-01-29 NOTE — TELEPHONE ENCOUNTER
Patient is scheduled for a repeat EGD in April.     I discussed H. Pylori with patient and verbalized understanding.        Your Gastric biopsies were positive for H.pylori infection. I have attached a full explanation regarding this bacteria and we would like to treat you with the antibiotic regimen. The treatment is as listed below.     Please let me know if you have any questions or concerns. Our office number is [phone].     The treatment for H Pylori includes the following and medications have been sent to your pharmacy.     -PPI 40 mg (acid reducing) two times daily for 14 days  -Tetracycline 500 mg (antibiotic) four times daily for 14 days  -Metronidazole 250 mg (antibiotic) four times daily for 14 days  -Pepto bismol 262 mg (chewable tablet form) four times daily for 14 days     Please keep in mind:  -Do NOT start your medication until you have all four of them. It is important all medication is started together at the same time  -You can take the antibiotic medication together in the morning, afternoon, evening, and bedtime with food to avoid upsetting your stomach further.  -No alcohol for 14 days while being treated     Retesting:  -one month after FINISHING your treatment, please complete a H pylori stool test. You can go to any [facility]'s lab to  the supplies. They will instruct you how to complete stool test. Once complete, drop stool test back off at the lab. It is important you are off of PPI two weeks prior to ensure accurate results.      Thank you,  Helicobacter Pylori Infection (H. pylori)  What are H Pylori?  Bacteria that can enter your body and live in your digestive tract. After many years, it can cause sores (ulcers) in the lining of your stomach or the upper part of your small intestine. It is diagnosed through Endoscopy Biopsy (EGD) or specific stool test.   Is it contagious?  YES! It is passed from person to person through Saliva, Fecal contamination in food/water, and poor  Hygiene practices. Unfortunately, there is no way to prevent it, but you can reduce the risk by:  Washing your hands before and after eating.  After using the bathroom.  Eating food that has been handled and prepared safely.  Drinking only CLEAN, safe drinking water.  What are the symptoms?  Many people with h. Pylori don't have any signs or symptoms. If people get an illness caused by H Pylori, the symptoms may include   Pain & swelling  Nausea  Bloating  Dizziness  How is it treated? There is a specific course of medications to follow to ensure the treatment is effective. It is important to make sure you have all the medications in the prescribe course before starting the treatment. DO NOT START THE TREATMENT UNLESS YOU HAVE ALL OF THE REQUIRED MEDICATIONS IN YOUR POSSESSION!! Some antibiotics may require a prior authorization from your insurance company. Call the office to notify us if that is the case. [phone]. We will do a prior auth which can take up to 10 days.  Some antibiotics interfere with statin medications, in which you may be asked to hold the statin during H pylori treatment. The H Pylori treatment course is 14 days. Medications included in the treatment include:  2  antibiotics- it is recommended to take antibiotics with food & they need to be taken consistently and together Do not skip doses.  Pepto Bismol chewable tablets- you will chew 1-2 of these prior to taking antibiotics  And a PPI medication (Omeprazole, Pantoprazole, Esomeprazole or Lansoprazole)- if you are already taking one of these, we ask that you take it twice daily on an empty stomach if you are not already.   Once the 14 days of treatment are completed, you will stop all medication for 2 weeks. You may resume your Statin medication if you were asked to hold it. You will then need to complete a stool specimen. We ask that you also discontinue your PPI medication during these following 2 weeks because it can give a false positive  when submitting the stool specimen. After you complete the stool testing, you may resume your PPI medication if you feel you need it.   If the stool comes back positive for H pylori again, we may need to repeat treatment with a different antibiotic course.

## 2025-01-30 RX ORDER — METRONIDAZOLE 250 MG/1
250 TABLET ORAL EVERY 6 HOURS
Qty: 56 TABLET | Refills: 0 | Status: SHIPPED | OUTPATIENT
Start: 2025-01-30 | End: 2025-02-13

## 2025-01-30 RX ORDER — BISMUTH SUBSALICYLATE 262 MG/1
524 TABLET, CHEWABLE ORAL
Qty: 112 TABLET | Refills: 0 | Status: SHIPPED | OUTPATIENT
Start: 2025-01-30 | End: 2025-02-13

## 2025-01-30 RX ORDER — PANTOPRAZOLE SODIUM 40 MG/1
40 TABLET, DELAYED RELEASE ORAL 2 TIMES DAILY
Qty: 28 TABLET | Refills: 0 | Status: SHIPPED | OUTPATIENT
Start: 2025-01-30 | End: 2025-02-13

## 2025-01-30 RX ORDER — TETRACYCLINE HYDROCHLORIDE 500 MG/1
500 CAPSULE ORAL 4 TIMES DAILY
Qty: 56 CAPSULE | Refills: 0 | Status: SHIPPED | OUTPATIENT
Start: 2025-01-30 | End: 2025-02-13

## 2025-01-30 NOTE — TELEPHONE ENCOUNTER
I contacted patient and conveyed Dr. Yung response as noted: No need to check for stool for H. pylori antigen.  We can do the biopsies when she comes for colonoscopy    I reviewed that prescriptions have not been signed off yet but they will be sent to her pharmacy.

## 2025-02-04 ENCOUNTER — NURSE TRIAGE (OUTPATIENT)
Age: 60
End: 2025-02-04

## 2025-02-04 NOTE — TELEPHONE ENCOUNTER
Patient requesting to speak with a nurse regarding medication given for H.Pylori.  Patient stating she is having stomach issues, diarrhea / Explained a message would be sent to the nurses having someone call her back.

## 2025-02-04 NOTE — TELEPHONE ENCOUNTER
"Pt. Started H. Pylori treatment on Saturday , she started with diarrhea yesterday, moving her bowels 6 times yesterday and 4 times today, describes stool as mud like, denies loose watery stool, advised from the antibiotics she can have a change in her bowel, taking Pepto bismol chewables and her stools have been discolored which I advised was normal, advised to eat bland foods, avoiding dairy and other triggering foods that could make diarrhea worse, advised to monitor for worsening symptoms, stay hydrated, pt verbalized understanding, please advise with anything further     Reason for Disposition   MILD diarrhea and taking antibiotics    Answer Assessment - Initial Assessment Questions  1. ANTIBIOTIC: \"What antibiotic are you taking?\" \"How many times per day?\"      Tetracycline and flagyl  2. ANTIBIOTIC ONSET: \"When was the antibiotic started?\"      Saturday   3. DIARRHEA SEVERITY: \"How bad is the diarrhea?\" \"How many more stools have you had in the past 24 hours than normal?\"       6 times a day   4. ONSET: \"When did the diarrhea begin?\"       Monday   5. BM CONSISTENCY: \"How loose or watery is the diarrhea?\"       Mud like   6. VOMITING: \"Are you also vomiting?\" If Yes, ask: \"How many times in the past 24 hours?\"       Denies   7. ABDOMEN PAIN: \"Are you having any abdomen pain?\" If Yes, ask: \"What does it feel like?\" (e.g., crampy, dull, intermittent, constant)       Denies   9. ORAL INTAKE: If vomiting, \"Have you been able to drink liquids?\" \"How much liquids have you had in the past 24 hours?\"      Eating and drinking ok   10. HYDRATION: \"Any signs of dehydration?\" (e.g., dry mouth [not just dry lips], too weak to stand, dizziness, new weight loss) \"When did you last urinate?\"        Denies   11. EXPOSURE: \"Have you traveled to a foreign country recently?\" \"Have you been exposed to anyone with diarrhea?\" \"Could you have eaten any food that was spoiled?\"        Pt. Being treated for H. Pylori infection   12. " "OTHER SYMPTOMS: \"Do you have any other symptoms?\" (e.g., fever, blood in stool)        Denies   13. PREGNANCY: \"Is there any chance you are pregnant?\" \"When was your last menstrual period?\"        Denies    Protocols used: Diarrhea on Antibiotics-Adult-[place]    "

## 2025-02-06 DIAGNOSIS — R19.7 DIARRHEA, UNSPECIFIED TYPE: Primary | ICD-10-CM

## 2025-02-06 NOTE — TELEPHONE ENCOUNTER
I recommend stool infectious studies in the setting of diarrhea on abx, rule out c dif. Avoid imodium for now until stool studies come back. I can send small script of bentyl to take before meals to see if this helps. Medication has risk of dizziness, lightheadedness and fatigue.  Recommend taking at home for the first time.

## 2025-02-08 DIAGNOSIS — A04.8 H. PYLORI INFECTION: ICD-10-CM

## 2025-02-10 DIAGNOSIS — K21.00 GASTROESOPHAGEAL REFLUX DISEASE WITH ESOPHAGITIS: ICD-10-CM

## 2025-02-10 DIAGNOSIS — A04.8 H. PYLORI INFECTION: Primary | ICD-10-CM

## 2025-02-10 RX ORDER — PANTOPRAZOLE SODIUM 40 MG/1
40 TABLET, DELAYED RELEASE ORAL DAILY
Qty: 30 TABLET | Refills: 3 | Status: SHIPPED | OUTPATIENT
Start: 2025-02-10

## 2025-02-10 RX ORDER — PANTOPRAZOLE SODIUM 40 MG/1
TABLET, DELAYED RELEASE ORAL
Qty: 28 TABLET | Refills: 0 | OUTPATIENT
Start: 2025-02-10

## 2025-02-10 NOTE — TELEPHONE ENCOUNTER
Please call patient and inform her I renewed her pantoprazole 40 mg daily with 2 refills for her stomach ulcers.  Please inform her that she needs to hold the PPI for 2 weeks prior to obtaining stool for H. pylori which should be obtained 1-2 months after she completes treatment of H pylori.

## 2025-02-10 NOTE — TELEPHONE ENCOUNTER
Please inform patient that the current prescription for pantoprazole 2 times daily for 14 days is for treatment for H. pylori.

## 2025-02-21 ENCOUNTER — APPOINTMENT (OUTPATIENT)
Dept: LAB | Facility: CLINIC | Age: 60
End: 2025-02-21

## 2025-02-24 ENCOUNTER — APPOINTMENT (OUTPATIENT)
Dept: LAB | Facility: CLINIC | Age: 60
End: 2025-02-24
Payer: COMMERCIAL

## 2025-02-24 DIAGNOSIS — R19.7 DIARRHEA, UNSPECIFIED TYPE: ICD-10-CM

## 2025-02-24 DIAGNOSIS — A04.8 H. PYLORI INFECTION: ICD-10-CM

## 2025-02-24 PROCEDURE — 87505 NFCT AGENT DETECTION GI: CPT

## 2025-02-24 PROCEDURE — 87338 HPYLORI STOOL AG IA: CPT

## 2025-02-25 ENCOUNTER — RESULTS FOLLOW-UP (OUTPATIENT)
Dept: GASTROENTEROLOGY | Facility: AMBULARY SURGERY CENTER | Age: 60
End: 2025-02-25

## 2025-02-25 LAB
C COLI+JEJUNI TUF STL QL NAA+PROBE: NEGATIVE
C DIFF TOX GENS STL QL NAA+PROBE: NEGATIVE
EC STX1+STX2 GENES STL QL NAA+PROBE: NEGATIVE
SALMONELLA SP SPAO STL QL NAA+PROBE: NEGATIVE
SHIGELLA SP+EIEC IPAH STL QL NAA+PROBE: NEGATIVE

## 2025-02-26 ENCOUNTER — RESULTS FOLLOW-UP (OUTPATIENT)
Age: 60
End: 2025-02-26

## 2025-02-26 LAB — H PYLORI AG STL QL IA: NEGATIVE

## 2025-03-04 NOTE — TELEPHONE ENCOUNTER
----- Message from Fany Portillo PA-C sent at 3/3/2025 11:08 AM EST -----  Patient has not read result- please call. Thank you!

## 2025-03-05 ENCOUNTER — TELEPHONE (OUTPATIENT)
Age: 60
End: 2025-03-05

## 2025-03-05 NOTE — TELEPHONE ENCOUNTER
SPOKE WITH PT, COMPLETED H.PYLORI TREATMENT, STOOL TEST NEGATIVE. PT INQUIRING IF SHE CAN RESUME TAKING PANTOPRAZOLE 40 MG DAILY AS SHE WAS TAKING THIS PRIOR TO TREATMENT. PT HAD EGD 1/17/25, FOUND TO HAVE GASTRIC ULCERS. PT ADVISED SHE CAN RESUME.

## 2025-04-28 ENCOUNTER — ANESTHESIA (OUTPATIENT)
Dept: GASTROENTEROLOGY | Facility: HOSPITAL | Age: 60
End: 2025-04-28
Payer: COMMERCIAL

## 2025-04-28 ENCOUNTER — HOSPITAL ENCOUNTER (OUTPATIENT)
Dept: GASTROENTEROLOGY | Facility: HOSPITAL | Age: 60
Setting detail: OUTPATIENT SURGERY
Discharge: HOME | End: 2025-04-28
Attending: INTERNAL MEDICINE
Payer: COMMERCIAL

## 2025-04-28 ENCOUNTER — ANESTHESIA EVENT (OUTPATIENT)
Dept: GASTROENTEROLOGY | Facility: HOSPITAL | Age: 60
End: 2025-04-28
Payer: COMMERCIAL

## 2025-04-28 VITALS
SYSTOLIC BLOOD PRESSURE: 112 MMHG | HEART RATE: 70 BPM | HEIGHT: 67 IN | OXYGEN SATURATION: 99 % | TEMPERATURE: 97.1 F | DIASTOLIC BLOOD PRESSURE: 62 MMHG | WEIGHT: 161 LBS | BODY MASS INDEX: 25.27 KG/M2 | RESPIRATION RATE: 18 BRPM

## 2025-04-28 DIAGNOSIS — K25.9 GASTRIC ULCER, UNSPECIFIED CHRONICITY, UNSPECIFIED WHETHER GASTRIC ULCER HEMORRHAGE OR PERFORATION PRESENT: ICD-10-CM

## 2025-04-28 PROCEDURE — 43235 EGD DIAGNOSTIC BRUSH WASH: CPT | Performed by: INTERNAL MEDICINE

## 2025-04-28 RX ORDER — PROPOFOL 10 MG/ML
INJECTION, EMULSION INTRAVENOUS AS NEEDED
Status: DISCONTINUED | OUTPATIENT
Start: 2025-04-28 | End: 2025-04-28

## 2025-04-28 RX ORDER — SODIUM CHLORIDE, SODIUM LACTATE, POTASSIUM CHLORIDE, CALCIUM CHLORIDE 600; 310; 30; 20 MG/100ML; MG/100ML; MG/100ML; MG/100ML
INJECTION, SOLUTION INTRAVENOUS CONTINUOUS PRN
Status: DISCONTINUED | OUTPATIENT
Start: 2025-04-28 | End: 2025-04-28

## 2025-04-28 RX ORDER — LIDOCAINE HYDROCHLORIDE 10 MG/ML
INJECTION, SOLUTION EPIDURAL; INFILTRATION; INTRACAUDAL; PERINEURAL AS NEEDED
Status: DISCONTINUED | OUTPATIENT
Start: 2025-04-28 | End: 2025-04-28

## 2025-04-28 RX ADMIN — PROPOFOL 50 MG: 10 INJECTION, EMULSION INTRAVENOUS at 10:40

## 2025-04-28 RX ADMIN — LIDOCAINE HYDROCHLORIDE 50 MG: 10 INJECTION, SOLUTION EPIDURAL; INFILTRATION; INTRACAUDAL at 10:38

## 2025-04-28 RX ADMIN — PROPOFOL 150 MG: 10 INJECTION, EMULSION INTRAVENOUS at 10:38

## 2025-04-28 RX ADMIN — SODIUM CHLORIDE, SODIUM LACTATE, POTASSIUM CHLORIDE, AND CALCIUM CHLORIDE: .6; .31; .03; .02 INJECTION, SOLUTION INTRAVENOUS at 10:29

## 2025-04-28 NOTE — ANESTHESIA POSTPROCEDURE EVALUATION
Post-Op Assessment Note    CV Status:  Stable    Pain management: adequate       Mental Status:  Sleepy   Hydration Status:  Euvolemic   PONV Controlled:  Controlled   Airway Patency:  Patent     Post Op Vitals Reviewed: Yes    No anethesia notable event occurred.    Staff: CRNA       Last Filed PACU Vitals:  Vitals Value Taken Time   Temp     Pulse 65    BP 90/50    Resp     SpO2 100

## 2025-04-28 NOTE — ANESTHESIA PREPROCEDURE EVALUATION
Procedure:  EGD    Relevant Problems   CARDIO   (+) Chronic migraine without aura      GI/HEPATIC   (+) Gastroesophageal reflux disease with esophagitis      GYN   (+) Endometrial cancer (HCC)      HEMATOLOGY   (+) Acute blood loss anemia   (+) Iron deficiency anemia   (+) Thrombocytopenia (HCC)      NEURO/PSYCH   (+) Anxiety and depression   (+) Chronic migraine without aura   (+) Chronic pelvic pain in female      PULMONARY   (+) Dyspnea        Physical Exam    Airway    Mallampati score: II  TM Distance: >3 FB  Neck ROM: full     Dental   No notable dental hx     Cardiovascular  Rhythm: regular, Rate: normal    Pulmonary   Breath sounds clear to auscultation    Other Findings  post-pubertal.      Anesthesia Plan  ASA Score- 2     Anesthesia Type- IV sedation with anesthesia with ASA Monitors.         Additional Monitors:     Airway Plan:            Plan Factors-Exercise tolerance (METS): >4 METS.    Chart reviewed.   Existing labs reviewed. Patient summary reviewed.    Patient is not a current smoker.              Induction- intravenous.    Postoperative Plan-         Informed Consent- Anesthetic plan and risks discussed with patient.  I personally reviewed this patient with the CRNA. Discussed and agreed on the Anesthesia Plan with the CRNA..      NPO Status:  No vitals data found for the desired time range.

## 2025-04-28 NOTE — H&P
History and Physical - [facility]  Lauren NAINA Ledezma 60 y.o. female MRN: [id number]        HPI: 60-year-old female was noted to have gastric ulcers on the previous EGD a few months ago.  Biopsies were positive for H. pylori status posttreatment with a negative stool antigen testing.    Historical Information   Past Medical History:   Diagnosis Date    Endometrial cancer (HCC) 2011    Last assessed: 2/8/17; age 46    GERD (gastroesophageal reflux disease)     Hyperlipidemia     Hypertension     Sinusitis     Streptococcal pharyngitis      Past Surgical History:   Procedure Laterality Date    CATARACT EXTRACTION, BILATERAL      COLONOSCOPY      DIAGNOSTIC LAPAROSCOPY      EGD N/A     HYSTERECTOMY      LAPAROSCOPIC TOTAL HYSTERECTOMY      MOLE REMOVAL  07/12/2019    TOTAL ABDOMINAL HYSTERECTOMY W/ BILATERAL SALPINGOOPHORECTOMY Bilateral 2011    Last assessed: 2/8/17; age 46     Social History   Social History     Substance and Sexual Activity   Alcohol Use No     Social History     Substance and Sexual Activity   Drug Use No     Social History     Tobacco Use   Smoking Status Never   Smokeless Tobacco Never   Tobacco Comments    No second hand smoke exposure     Family History   Problem Relation Age of Onset    Hypertension Mother     Breast cancer Mother 49    Other Father         pace maker     Prostate cancer Father 65    Colon cancer Father 73    Anxiety disorder Sister     Depression Sister     No Known Problems Maternal Grandmother     No Known Problems Maternal Grandfather     No Known Problems Paternal Grandmother     No Known Problems Paternal Grandfather     No Known Problems Maternal Aunt     No Known Problems Maternal Aunt     No Known Problems Paternal Aunt        Meds/Allergies     Not in a hospital admission.    Allergies   Allergen Reactions    Amoxicillin Diarrhea and GI Intolerance    Pollen Extract Other (See Comments)       Objective     Blood pressure 129/65, pulse 66, temperature  "(!) 97.3 °F (36.3 °C), temperature source Temporal, resp. rate 18, height 5' 7\" (1.702 m), weight 73 kg (161 lb), SpO2 98%, not currently breastfeeding.    Physical Exam:    Chest- CTA  Heart- RRR  Abdomen- NT/ND  Extremities- No edema    ASSESSMENT:     History of gastric ulcers    PLAN:    EGD              "

## 2025-05-12 DIAGNOSIS — K21.00 GASTROESOPHAGEAL REFLUX DISEASE WITH ESOPHAGITIS: ICD-10-CM

## 2025-05-13 RX ORDER — PANTOPRAZOLE SODIUM 40 MG/1
40 TABLET, DELAYED RELEASE ORAL DAILY
Qty: 90 TABLET | Refills: 1 | Status: SHIPPED | OUTPATIENT
Start: 2025-05-13